# Patient Record
Sex: MALE | Race: OTHER | NOT HISPANIC OR LATINO | Employment: FULL TIME | URBAN - METROPOLITAN AREA
[De-identification: names, ages, dates, MRNs, and addresses within clinical notes are randomized per-mention and may not be internally consistent; named-entity substitution may affect disease eponyms.]

---

## 2023-01-04 ENCOUNTER — HOSPITAL ENCOUNTER (EMERGENCY)
Facility: HOSPITAL | Age: 43
Discharge: HOME | End: 2023-01-04
Attending: INTERNAL MEDICINE

## 2023-01-04 VITALS
SYSTOLIC BLOOD PRESSURE: 164 MMHG | HEIGHT: 69 IN | RESPIRATION RATE: 16 BRPM | WEIGHT: 235.44 LBS | BODY MASS INDEX: 34.87 KG/M2 | HEART RATE: 67 BPM | DIASTOLIC BLOOD PRESSURE: 77 MMHG | TEMPERATURE: 100 F | OXYGEN SATURATION: 97 %

## 2023-01-04 DIAGNOSIS — J10.1 INFLUENZA A: Primary | ICD-10-CM

## 2023-01-04 LAB
FLUAV AG UPPER RESP QL IA.RAPID: DETECTED
FLUBV AG UPPER RESP QL IA.RAPID: NOT DETECTED
SARS-COV-2 RNA RESP QL NAA+PROBE: NOT DETECTED
STREP A PCR (OHS): NOT DETECTED

## 2023-01-04 PROCEDURE — 25000003 PHARM REV CODE 250: Performed by: NURSE PRACTITIONER

## 2023-01-04 PROCEDURE — 87651 STREP A DNA AMP PROBE: CPT | Performed by: NURSE PRACTITIONER

## 2023-01-04 PROCEDURE — 0240U COVID/FLU A&B PCR: CPT | Performed by: NURSE PRACTITIONER

## 2023-01-04 PROCEDURE — 99284 EMERGENCY DEPT VISIT MOD MDM: CPT

## 2023-01-04 RX ORDER — ACETAMINOPHEN 500 MG
1000 TABLET ORAL
Status: COMPLETED | OUTPATIENT
Start: 2023-01-04 | End: 2023-01-04

## 2023-01-04 RX ORDER — OSELTAMIVIR PHOSPHATE 75 MG/1
75 CAPSULE ORAL 2 TIMES DAILY
Qty: 10 CAPSULE | Refills: 0 | Status: SHIPPED | OUTPATIENT
Start: 2023-01-04 | End: 2023-01-09

## 2023-01-04 RX ORDER — PROMETHAZINE HYDROCHLORIDE AND DEXTROMETHORPHAN HYDROBROMIDE 6.25; 15 MG/5ML; MG/5ML
5 SYRUP ORAL EVERY 4 HOURS PRN
Qty: 120 ML | Refills: 0 | Status: SHIPPED | OUTPATIENT
Start: 2023-01-04 | End: 2023-01-14

## 2023-01-04 RX ADMIN — ACETAMINOPHEN 1000 MG: 500 TABLET ORAL at 02:01

## 2023-01-04 NOTE — ED PROVIDER NOTES
Encounter Date: 1/4/2023       History     Chief Complaint   Patient presents with    Cough     Cough/congestion x 3 days. Febrile. No antipyretics taken at home.     The patient presents with occas nonprod cough, sore throat, nasal congestion, subjective fever, no cp or sob, no known covid-19 exposure.  The onset was 2  days ago.  The course/duration of symptoms is constant.  The degree at present is minimal.  The exacerbating factor is none.  The relieving factor is none.  Risk factors consist of none.  Prior episodes: occasional.  Associated symptoms: dry cough, nasal congestion, rhinorrhea, sore throat, denies chest pain and denies shortness of breath.  Additional history: none.       Review of patient's allergies indicates:  No Known Allergies  History reviewed. No pertinent past medical history.  History reviewed. No pertinent surgical history.  History reviewed. No pertinent family history.  Social History     Tobacco Use    Smoking status: Every Day     Packs/day: 0.25     Types: Cigarettes    Smokeless tobacco: Never     Review of Systems   Constitutional:  Positive for fever.   HENT:  Positive for congestion and sore throat.    Respiratory:  Positive for cough. Negative for shortness of breath.    Cardiovascular:  Negative for chest pain.   Gastrointestinal:  Negative for nausea.   Genitourinary:  Negative for dysuria.   Musculoskeletal:  Negative for back pain.   Skin:  Negative for rash.   Neurological:  Negative for weakness.   Hematological:  Does not bruise/bleed easily.   All other systems reviewed and are negative.    Physical Exam     Initial Vitals [01/04/23 1342]   BP Pulse Resp Temp SpO2   (!) 164/77 88 20 (!) 103 °F (39.4 °C) 97 %      MAP       --         Physical Exam    Nursing note and vitals reviewed.  Constitutional: He appears well-developed and well-nourished.   HENT:   Head: Normocephalic and atraumatic.   Right Ear: Tympanic membrane normal.   Left Ear: Tympanic membrane normal.    Nose: Nose normal.   Mouth/Throat: Uvula is midline, oropharynx is clear and moist and mucous membranes are normal.   Neck: Neck supple.   Normal range of motion.  Cardiovascular:  Normal rate, regular rhythm, normal heart sounds and intact distal pulses.           Pulmonary/Chest: Breath sounds normal.   Abdominal: Abdomen is soft. Bowel sounds are normal.   Musculoskeletal:         General: Normal range of motion.      Cervical back: Normal range of motion and neck supple.     Neurological: He is alert and oriented to person, place, and time. He has normal strength.   Skin: Skin is warm and dry.   Psychiatric: He has a normal mood and affect.       ED Course   Procedures  Labs Reviewed   COVID/FLU A&B PCR - Abnormal; Notable for the following components:       Result Value    Influenza A PCR Detected (*)     All other components within normal limits    Narrative:     The Xpert Xpress SARS-CoV-2/FLU/RSV plus is a rapid, multiplexed real-time PCR test intended for the simultaneous qualitative detection and differentiation of SARS-CoV-2, Influenza A, Influenza B, and respiratory syncytial virus (RSV) viral RNA in either nasopharyngeal swab or nasal swab specimens.         STREP GROUP A BY PCR - Normal    Narrative:     The Xpert Xpress Strep A test is a rapid, qualitative in vitro diagnostic test for the detection of Streptococcus pyogenes (Group A ß-hemolytic Streptococcus, Strep A) in throat swab specimens from patients with signs and symptoms of pharyngitis.            Imaging Results    None          Medications   acetaminophen tablet 1,000 mg (1,000 mg Oral Given 1/4/23 1418)     Medical Decision Making:   History:   Old Records Summarized: records from clinic visits and records from previous admission(s).  Clinical Tests:   Lab Tests: Ordered and Reviewed  4:15 PM DISPOSITION: The patient is resting comfortably in no acute distress.  He is hemodynamically stable and is without objective evidence for acute  process requiring urgent intervention or hospitalization. I provided counseling to patient with regard to condition, the treatment plan, specific conditions for return, and the importance of follow up. Detailed written and verbal instructions provided to patient and he expressed a verbal understanding of the discharge instructions and overall management plan. Reiterated the importance of medication administration and safety and advised patient to follow up with primary care provider in 3-5 days or sooner if needed.  Answered questions at this time. The patient is stable for discharge.                           Clinical Impression:   Final diagnoses:  [J10.1] Influenza A (Primary)        ED Disposition Condition    Discharge Stable          ED Prescriptions       Medication Sig Dispense Start Date End Date Auth. Provider    oseltamivir (TAMIFLU) 75 MG capsule Take 1 capsule (75 mg total) by mouth 2 (two) times daily. for 5 days 10 capsule 1/4/2023 1/9/2023 BRET Leon    promethazine-dextromethorphan (PROMETHAZINE-DM) 6.25-15 mg/5 mL Syrp Take 5 mLs by mouth every 4 (four) hours as needed (cough). 120 mL 1/4/2023 1/14/2023 BRET Leon          Follow-up Information       Follow up With Specialties Details Why Contact Info    follow up with your pcp in 3-5 days        [facility] Emergency Medicine  If symptoms worsen [address] [facility] [address] [phone]             BRET Leon  01/04/23 3795

## 2023-03-11 ENCOUNTER — HOSPITAL ENCOUNTER (EMERGENCY)
Facility: HOSPITAL | Age: 43
Discharge: HOME | End: 2023-03-11
Attending: STUDENT IN AN ORGANIZED HEALTH CARE EDUCATION/TRAINING PROGRAM

## 2023-03-11 VITALS
HEART RATE: 64 BPM | TEMPERATURE: 98 F | BODY MASS INDEX: 37.35 KG/M2 | HEIGHT: 66 IN | SYSTOLIC BLOOD PRESSURE: 121 MMHG | RESPIRATION RATE: 15 BRPM | OXYGEN SATURATION: 96 % | DIASTOLIC BLOOD PRESSURE: 68 MMHG | WEIGHT: 232.38 LBS

## 2023-03-11 DIAGNOSIS — R07.89 CHEST WALL PAIN: ICD-10-CM

## 2023-03-11 DIAGNOSIS — R07.9 CHEST PAIN: ICD-10-CM

## 2023-03-11 DIAGNOSIS — M25.512 LEFT SHOULDER PAIN: ICD-10-CM

## 2023-03-11 DIAGNOSIS — M62.838 MUSCLE SPASM OF LEFT SHOULDER: ICD-10-CM

## 2023-03-11 DIAGNOSIS — S29.011A STRAIN OF LEFT PECTORALIS MUSCLE, INITIAL ENCOUNTER: Primary | ICD-10-CM

## 2023-03-11 LAB
ALBUMIN SERPL-MCNC: 3.9 G/DL (ref 3.5–5)
ALBUMIN/GLOB SERPL: 0.8 RATIO (ref 1.1–2)
ALP SERPL-CCNC: 103 UNIT/L (ref 40–150)
ALT SERPL-CCNC: 80 UNIT/L (ref 0–55)
AST SERPL-CCNC: 71 UNIT/L (ref 5–34)
BASOPHILS # BLD AUTO: 0.03 X10(3)/MCL (ref 0–0.2)
BASOPHILS NFR BLD AUTO: 0.4 %
BILIRUBIN DIRECT+TOT PNL SERPL-MCNC: 0.4 MG/DL
BNP BLD-MCNC: 18.3 PG/ML
BUN SERPL-MCNC: 6.7 MG/DL (ref 8.9–20.6)
CALCIUM SERPL-MCNC: 8.9 MG/DL (ref 8.4–10.2)
CHLORIDE SERPL-SCNC: 105 MMOL/L (ref 98–107)
CK MB SERPL-MCNC: 6.5 NG/ML
CK SERPL-CCNC: 365 U/L (ref 30–200)
CO2 SERPL-SCNC: 18 MMOL/L (ref 22–29)
CREAT SERPL-MCNC: 0.81 MG/DL (ref 0.73–1.18)
EOSINOPHIL # BLD AUTO: 0.13 X10(3)/MCL (ref 0–0.9)
EOSINOPHIL NFR BLD AUTO: 1.8 %
ERYTHROCYTE [DISTWIDTH] IN BLOOD BY AUTOMATED COUNT: 12.5 % (ref 11.5–17)
GFR SERPLBLD CREATININE-BSD FMLA CKD-EPI: >60 MLS/MIN/1.73/M2
GLOBULIN SER-MCNC: 4.6 GM/DL (ref 2.4–3.5)
GLUCOSE SERPL-MCNC: 180 MG/DL (ref 74–100)
HCT VFR BLD AUTO: 52.9 % (ref 42–52)
HGB BLD-MCNC: 18.3 G/DL (ref 14–18)
IMM GRANULOCYTES # BLD AUTO: 0.02 X10(3)/MCL (ref 0–0.04)
IMM GRANULOCYTES NFR BLD AUTO: 0.3 %
LYMPHOCYTES # BLD AUTO: 2.66 X10(3)/MCL (ref 0.6–4.6)
LYMPHOCYTES NFR BLD AUTO: 36 %
MCH RBC QN AUTO: 30.2 PG
MCHC RBC AUTO-ENTMCNC: 34.6 G/DL (ref 33–36)
MCV RBC AUTO: 87.3 FL (ref 80–94)
MONOCYTES # BLD AUTO: 0.54 X10(3)/MCL (ref 0.1–1.3)
MONOCYTES NFR BLD AUTO: 7.3 %
NEUTROPHILS # BLD AUTO: 4 X10(3)/MCL (ref 2.1–9.2)
NEUTROPHILS NFR BLD AUTO: 54.2 %
NRBC BLD AUTO-RTO: 0 %
PLATELET # BLD AUTO: 155 X10(3)/MCL (ref 130–400)
PMV BLD AUTO: 11.4 FL (ref 7.4–10.4)
POTASSIUM SERPL-SCNC: 4 MMOL/L (ref 3.5–5.1)
PROT SERPL-MCNC: 8.5 GM/DL (ref 6.4–8.3)
RBC # BLD AUTO: 6.06 X10(6)/MCL (ref 4.7–6.1)
SODIUM SERPL-SCNC: 137 MMOL/L (ref 136–145)
TROPONIN I SERPL-MCNC: 0.01 NG/ML (ref 0–0.04)
WBC # SPEC AUTO: 7.4 X10(3)/MCL (ref 4.5–11.5)

## 2023-03-11 PROCEDURE — 82553 CREATINE MB FRACTION: CPT | Performed by: FAMILY MEDICINE

## 2023-03-11 PROCEDURE — 85025 COMPLETE CBC W/AUTO DIFF WBC: CPT | Performed by: FAMILY MEDICINE

## 2023-03-11 PROCEDURE — 80053 COMPREHEN METABOLIC PANEL: CPT | Performed by: FAMILY MEDICINE

## 2023-03-11 PROCEDURE — 63600175 PHARM REV CODE 636 W HCPCS: Performed by: STUDENT IN AN ORGANIZED HEALTH CARE EDUCATION/TRAINING PROGRAM

## 2023-03-11 PROCEDURE — 84484 ASSAY OF TROPONIN QUANT: CPT | Performed by: FAMILY MEDICINE

## 2023-03-11 PROCEDURE — 99285 EMERGENCY DEPT VISIT HI MDM: CPT | Mod: 25

## 2023-03-11 PROCEDURE — 83880 ASSAY OF NATRIURETIC PEPTIDE: CPT | Performed by: FAMILY MEDICINE

## 2023-03-11 PROCEDURE — 96372 THER/PROPH/DIAG INJ SC/IM: CPT | Performed by: STUDENT IN AN ORGANIZED HEALTH CARE EDUCATION/TRAINING PROGRAM

## 2023-03-11 PROCEDURE — 82550 ASSAY OF CK (CPK): CPT | Performed by: FAMILY MEDICINE

## 2023-03-11 PROCEDURE — 93005 ELECTROCARDIOGRAM TRACING: CPT

## 2023-03-11 RX ORDER — KETOROLAC TROMETHAMINE 10 MG/1
10 TABLET, FILM COATED ORAL EVERY 6 HOURS PRN
Qty: 20 TABLET | Refills: 0 | Status: SHIPPED | OUTPATIENT
Start: 2023-03-11 | End: 2023-03-16

## 2023-03-11 RX ORDER — KETOROLAC TROMETHAMINE 30 MG/ML
30 INJECTION, SOLUTION INTRAMUSCULAR; INTRAVENOUS
Status: COMPLETED | OUTPATIENT
Start: 2023-03-11 | End: 2023-03-11

## 2023-03-11 RX ORDER — NAPROXEN 500 MG/1
500 TABLET ORAL 2 TIMES DAILY PRN
Qty: 60 TABLET | Refills: 0 | Status: SHIPPED | OUTPATIENT
Start: 2023-03-11 | End: 2023-03-11

## 2023-03-11 RX ADMIN — KETOROLAC TROMETHAMINE 30 MG: 30 INJECTION, SOLUTION INTRAMUSCULAR at 08:03

## 2023-03-11 NOTE — Clinical Note
"Taiwo"Taiwo"Lauren Hyman was seen and treated in our emergency department on 3/11/2023.  He may return with limitations on 03/12/2023.  No heavy lifting for 4 days     Sincerely,      Pedro Kamara MD    "

## 2023-03-11 NOTE — ED PROVIDER NOTES
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 Also states swelling and tingling down left arm. Describes pain as "sharp" in nature and rates 10/10. Vss. 12 lead ekg obtained.     HPI    42-year-old  male who speaks fluent English presents emergency department for left-sided chest/arm pain that started 2 days ago.  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  Patient states that his arm and chest have been hurting ever since.  States it hurts when he moves his arm or presses on his chest wall.  Denies any shortness of breath.  States that he has been using Biofreeze with minimal improvement.  No ibuprofen or acetaminophen.  Denies a cough.  States that he can move his arm fully although it just hurts.    Review of patient's allergies indicates:  No Known Allergies  No past medical history on file.  No past surgical history on file.  No family history on file.  Social History     Tobacco Use    Smoking status: Every Day     Packs/day: 0.25     Types: Cigarettes    Smokeless tobacco: Never     Review of Systems   Constitutional:  Negative for fever.   Respiratory:  Negative for cough and shortness of breath.    Cardiovascular:  Positive for chest pain.   Gastrointestinal:  Negative for abdominal pain.   Musculoskeletal:         Left arm/shoulder pain   All other systems reviewed and are negative.    Physical Exam     Initial Vitals [03/11/23 0625]   BP Pulse Resp Temp SpO2   128/70 70 18 98.1 °F (36.7 °C) 95 %      MAP       --         Physical Exam    Nursing note and vitals reviewed.  Constitutional: He appears well-developed and well-nourished. No distress.   Cardiovascular:  Normal rate and regular rhythm.           Pulmonary/Chest: Breath sounds normal. No respiratory distress. He " exhibits tenderness (Generalized left-sided tenderness to the pictorial muscle and the left anterior shoulder girdle with palpation reproducing patient's complaint).   Abdominal: Abdomen is soft. Bowel sounds are normal. There is no abdominal tenderness.   Musculoskeletal:         General: Tenderness (generalized tenderness about the left shoulder with full range of motion) present. Normal range of motion.     Neurological: He is alert and oriented to person, place, and time. He has normal strength. GCS score is 15. GCS eye subscore is 4. GCS verbal subscore is 5. GCS motor subscore is 6.   Skin: Skin is warm. Capillary refill takes less than 2 seconds. No rash noted. No erythema.   Psychiatric: He has a normal mood and affect. Thought content normal.       ED Course   Procedures  Labs Reviewed   COMPREHENSIVE METABOLIC PANEL - Abnormal; Notable for the following components:       Result Value    Carbon Dioxide 18 (*)     Glucose Level 180 (*)     Blood Urea Nitrogen 6.7 (*)     Protein Total 8.5 (*)     Globulin 4.6 (*)     Albumin/Globulin Ratio 0.8 (*)     Alanine Aminotransferase 80 (*)     Aspartate Aminotransferase 71 (*)     All other components within normal limits   CK - Abnormal; Notable for the following components:    Creatine Kinase 365 (*)     All other components within normal limits   CBC WITH DIFFERENTIAL - Abnormal; Notable for the following components:    Hgb 18.3 (*)     Hct 52.9 (*)     MPV 11.4 (*)     All other components within normal limits   CK-MB - Normal   TROPONIN I - Normal   B-TYPE NATRIURETIC PEPTIDE - Normal   CBC W/ AUTO DIFFERENTIAL    Narrative:     The following orders were created for panel order CBC Auto Differential.  Procedure                               Abnormality         Status                     ---------                               -----------         ------                     CBC with Differential[[id number]]        Abnormal            Final result                  Please view results for these tests on the individual orders.   EXTRA TUBES    Narrative:     The following orders were created for panel order EXTRA TUBES.  Procedure                               Abnormality         Status                     ---------                               -----------         ------                     Light Blue Top Hold[[id number]]                              In process                 Light Green Top Hold[[id number]]                             In process                 Gold Top Hold[[id number]]                                    In process                   Please view results for these tests on the individual orders.   LIGHT BLUE TOP HOLD   LIGHT GREEN TOP HOLD   GOLD TOP HOLD     EKG Readings: (Independently Interpreted)   Initial Reading: No STEMI. Rhythm: Normal Sinus Rhythm. Heart Rate: 71. Ectopy: No Ectopy. Conduction: Normal. ST Segments: Normal ST Segments. T Waves: Normal. T Waves Flipped: III and AVF. Axis: Right Axis Deviation. Clinical Impression: Normal Sinus Rhythm     Imaging Results              X-Ray Shoulder Trauma Left (Final result)  Result time 03/11/23 08:07:02      Final result by Drake Gallardo MD (03/11/23 08:07:02)                   Impression:      Degenerative changes of the acromioclavicular joint with no acute fracture identified.      Electronically signed by: Drake Gallardo  Date:    03/11/2023  Time:    08:07               Narrative:    EXAMINATION:  XR SHOULDER TRAUMA 3 VIEW LEFT    CLINICAL HISTORY:  Pain in left shoulder    TECHNIQUE:  Three views of the left shoulder were performed.    COMPARISON  None    FINDINGS:  No acute fracture.  The visualized lungs are unremarkable.  Osteophytes of the acromioclavicular joint are noted.                        Wet Read by Pedro Kamara MD (03/11/23 07:37:39, [facility], Emergency Medicine)    Arthritic changes.  No fractures or dislocations                                      X-Ray Chest 1 View (Final result)  Result time 03/11/23 07:53:40      Final result by Drake Gallardo MD (03/11/23 07:53:40)                   Impression:      No acute cardiopulmonary process.      Electronically signed by: Drake Gallardo  Date:    03/11/2023  Time:    07:53               Narrative:    EXAMINATION:  XR CHEST 1 VIEW    CLINICAL HISTORY:  Chest pain, unspecified    TECHNIQUE:  Single view of the chest    COMPARISON:  No prior imaging available for comparison.    FINDINGS:  No focal opacification, pleural effusion, or pneumothorax.    The cardiomediastinal silhouette is within normal limits.    No acute osseous abnormality.                        Wet Read by Pedro Kamara MD (03/11/23 07:37:17, [facility], Emergency Medicine)    Lungs clear with no consolidations                                     Medications   ketorolac injection 30 mg (30 mg Intramuscular Given 3/11/23 0800)     Medical Decision Making:   Differential Diagnosis:   Musculoskeletal pain, muscle spasm, muscle strain, chest pain, pneumothorax, ACS   Medical Decision Making  Problems Addressed:  Left shoulder pain: acute illness or injury  Muscle spasm of left shoulder: acute illness or injury  Strain of left pectoralis muscle, initial encounter: acute illness or injury    Amount and/or Complexity of Data Reviewed  Labs: ordered. Decision-making details documented in ED Course.  Radiology: ordered and independent interpretation performed. Decision-making details documented in ED Course.  ECG/medicine tests: ordered and independent interpretation performed. Decision-making details documented in ED Course.    Risk  OTC drugs.  Prescription drug management.              ED Course as of 03/11/23 0823   Sat Mar 11, 2023   0708 WBC: 7.4 [BS]   0708 Hemoglobin(!): 18.3 [BS]   0708 Hematocrit(!): 52.9 [BS]   0708 Platelets: 155 [BS]   0746 Sodium: 137 [BS]   0746 Potassium: 4.0 [BS]   0746 Chloride: 105 [BS]    0746 CO2(!): 18 [BS]   0746 Glucose(!): 180 [BS]   0746 BUN(!): 6.7 [BS]   0746 Creatinine: 0.81 [BS]   0746 CPK MB: 6.5 [BS]   0746 CPK(!): 365 [BS]   0822 Patient pain improved.  Will discharge.  ER precautions given.  Limitations to work.  Stretching information given. [BS]      ED Course User Index  [BS] Pedro Kamara MD                 Clinical Impression:   Final diagnoses:  [R07.9] Chest pain  [M25.512] Left shoulder pain  [R07.89] Chest wall pain  [M62.838] Muscle spasm of left shoulder  [S29.011A] Strain of left pectoralis muscle, initial encounter (Primary)        ED Disposition Condition    Discharge Stable          ED Prescriptions       Medication Sig Dispense Start Date End Date Auth. Provider    naproxen (NAPROSYN) 500 MG tablet Take 1 tablet (500 mg total) by mouth 2 (two) times daily as needed (pain). 60 tablet 3/11/2023 -- Pedro Kamara MD          Follow-up Information       Follow up With Specialties Details Why Contact Info    [facility] Emergency Medicine Go to  If symptoms worsen [address] [facility] [address] [phone]             Pedro Kamara MD  03/11/23 9756

## 2024-11-21 ENCOUNTER — HOSPITAL ENCOUNTER (EMERGENCY)
Facility: HOSPITAL | Age: 44
Discharge: HOME | End: 2024-11-22
Attending: EMERGENCY MEDICINE

## 2024-11-21 DIAGNOSIS — Z72.0 TOBACCO ABUSE: ICD-10-CM

## 2024-11-21 DIAGNOSIS — R07.89 CHEST WALL PAIN: Primary | ICD-10-CM

## 2024-11-21 DIAGNOSIS — R07.9 CHEST PAIN: ICD-10-CM

## 2024-11-21 PROCEDURE — 99285 EMERGENCY DEPT VISIT HI MDM: CPT | Mod: 25

## 2024-11-21 PROCEDURE — 93005 ELECTROCARDIOGRAM TRACING: CPT

## 2024-11-21 RX ORDER — KETOROLAC TROMETHAMINE 30 MG/ML
30 INJECTION, SOLUTION INTRAMUSCULAR; INTRAVENOUS
Status: COMPLETED | OUTPATIENT
Start: 2024-11-22 | End: 2024-11-21

## 2024-11-21 RX ORDER — KETOROLAC TROMETHAMINE 30 MG/ML
15 INJECTION, SOLUTION INTRAMUSCULAR; INTRAVENOUS
Status: DISCONTINUED | OUTPATIENT
Start: 2024-11-22 | End: 2024-11-21

## 2024-11-21 RX ADMIN — KETOROLAC TROMETHAMINE 30 MG: 30 INJECTION, SOLUTION INTRAMUSCULAR at 11:11

## 2024-11-21 NOTE — Clinical Note
"Taiwo Lr" Lauren Hyman was seen and treated in our emergency department on 11/21/2024.  He may return to work on 11/23/2024.       If you have any questions or concerns, please don't hesitate to call.       RN    "

## 2024-11-22 VITALS
DIASTOLIC BLOOD PRESSURE: 90 MMHG | TEMPERATURE: 97 F | WEIGHT: 236.31 LBS | OXYGEN SATURATION: 93 % | HEART RATE: 65 BPM | SYSTOLIC BLOOD PRESSURE: 148 MMHG | BODY MASS INDEX: 35.81 KG/M2 | HEIGHT: 68 IN | RESPIRATION RATE: 17 BRPM

## 2024-11-22 LAB
ALBUMIN SERPL-MCNC: 3.9 G/DL (ref 3.5–5)
ALBUMIN/GLOB SERPL: 1 RATIO (ref 1.1–2)
ALP SERPL-CCNC: 110 UNIT/L (ref 40–150)
ALT SERPL-CCNC: 80 UNIT/L (ref 0–55)
ANION GAP SERPL CALC-SCNC: 8 MEQ/L
AST SERPL-CCNC: 67 UNIT/L (ref 5–34)
BASOPHILS # BLD AUTO: 0.03 X10(3)/MCL
BASOPHILS NFR BLD AUTO: 0.3 %
BILIRUB SERPL-MCNC: 0.6 MG/DL
BUN SERPL-MCNC: 6 MG/DL (ref 8.9–20.6)
CALCIUM SERPL-MCNC: 9 MG/DL (ref 8.4–10.2)
CHLORIDE SERPL-SCNC: 108 MMOL/L (ref 98–107)
CO2 SERPL-SCNC: 25 MMOL/L (ref 22–29)
CREAT SERPL-MCNC: 0.78 MG/DL (ref 0.72–1.25)
CREAT/UREA NIT SERPL: 8
EOSINOPHIL # BLD AUTO: 0.16 X10(3)/MCL (ref 0–0.9)
EOSINOPHIL NFR BLD AUTO: 1.7 %
ERYTHROCYTE [DISTWIDTH] IN BLOOD BY AUTOMATED COUNT: 12.3 % (ref 11.5–17)
GFR SERPLBLD CREATININE-BSD FMLA CKD-EPI: >60 ML/MIN/1.73/M2
GLOBULIN SER-MCNC: 3.9 GM/DL (ref 2.4–3.5)
GLUCOSE SERPL-MCNC: 110 MG/DL (ref 74–100)
HCT VFR BLD AUTO: 52.6 % (ref 42–52)
HGB BLD-MCNC: 18.8 G/DL (ref 14–18)
IMM GRANULOCYTES # BLD AUTO: 0.03 X10(3)/MCL (ref 0–0.04)
IMM GRANULOCYTES NFR BLD AUTO: 0.3 %
LYMPHOCYTES # BLD AUTO: 3.65 X10(3)/MCL (ref 0.6–4.6)
LYMPHOCYTES NFR BLD AUTO: 38.1 %
MCH RBC QN AUTO: 31.1 PG (ref 27–31)
MCHC RBC AUTO-ENTMCNC: 35.7 G/DL (ref 33–36)
MCV RBC AUTO: 86.9 FL (ref 80–94)
MONOCYTES # BLD AUTO: 0.84 X10(3)/MCL (ref 0.1–1.3)
MONOCYTES NFR BLD AUTO: 8.8 %
NEUTROPHILS # BLD AUTO: 4.88 X10(3)/MCL (ref 2.1–9.2)
NEUTROPHILS NFR BLD AUTO: 50.8 %
NRBC BLD AUTO-RTO: 0 %
OHS QRS DURATION: 108 MS
OHS QTC CALCULATION: 442 MS
PLATELET # BLD AUTO: 178 X10(3)/MCL (ref 130–400)
PMV BLD AUTO: 10.1 FL (ref 7.4–10.4)
POTASSIUM SERPL-SCNC: 3.4 MMOL/L (ref 3.5–5.1)
PROT SERPL-MCNC: 7.8 GM/DL (ref 6.4–8.3)
RBC # BLD AUTO: 6.05 X10(6)/MCL (ref 4.7–6.1)
SODIUM SERPL-SCNC: 141 MMOL/L (ref 136–145)
TROPONIN I SERPL-MCNC: <0.01 NG/ML (ref 0–0.04)
WBC # BLD AUTO: 9.59 X10(3)/MCL (ref 4.5–11.5)

## 2024-11-22 PROCEDURE — 84484 ASSAY OF TROPONIN QUANT: CPT | Performed by: EMERGENCY MEDICINE

## 2024-11-22 PROCEDURE — 63600175 PHARM REV CODE 636 W HCPCS: Performed by: EMERGENCY MEDICINE

## 2024-11-22 PROCEDURE — 80053 COMPREHEN METABOLIC PANEL: CPT | Performed by: EMERGENCY MEDICINE

## 2024-11-22 PROCEDURE — 85025 COMPLETE CBC W/AUTO DIFF WBC: CPT | Performed by: EMERGENCY MEDICINE

## 2024-11-22 PROCEDURE — 96372 THER/PROPH/DIAG INJ SC/IM: CPT | Performed by: EMERGENCY MEDICINE

## 2024-11-22 RX ORDER — NAPROXEN 500 MG/1
500 TABLET ORAL 2 TIMES DAILY PRN
Qty: 14 TABLET | Refills: 1 | Status: SHIPPED | OUTPATIENT
Start: 2024-11-22

## 2024-11-22 NOTE — DISCHARGE INSTRUCTIONS
Tests are all fine and are consistent with musculoskeletal/chest wall pain.      Medication as prescribed should help, follow-up with your doctor or return to the ER as needed.      You do need to stop smoking.

## 2024-11-22 NOTE — ED PROVIDER NOTES
Encounter Date: 11/21/2024       History     Chief Complaint   Patient presents with    Chest Pain     Chest pain and cough x2 days     Left-sided chest, chest wall, shoulder girdle, and back pain for about 2 days, worse with certain movements, probably associated with repetitive heavy pulling and lifting that he does at work, similar episodes in the past not requiring treatment or evaluation.  Mild intermittent cough without sputum production.  No fever, chills, sweats, dyspnea, or other complaints.  He does smoke but has no known history of hypertension, heart disease or other significant past medical history.  Family history negative for significant cart heart disease.  Here with his girlfriend, no other complaints.    The history is provided by the patient and a significant other. No  was used.     Review of patient's allergies indicates:  No Known Allergies  History reviewed. No pertinent past medical history.  History reviewed. No pertinent surgical history.  No family history on file.  Social History     Tobacco Use    Smoking status: Every Day     Current packs/day: 0.25     Types: Cigarettes    Smokeless tobacco: Never     Review of Systems   Respiratory:  Positive for cough.    Cardiovascular:  Positive for chest pain.       Physical Exam     Initial Vitals [11/21/24 2314]   BP Pulse Resp Temp SpO2   (!) 145/82 68 20 96.8 °F (36 °C) 98 %      MAP       --         Physical Exam    Nursing note and vitals reviewed.  Constitutional: He appears well-developed and well-nourished. He is not diaphoretic. No distress.   Stocky, muscular, mildly obese, no distress   HENT:   Head: Normocephalic and atraumatic. Mouth/Throat: Oropharynx is clear and moist. No oropharyngeal exudate.   Eyes: Conjunctivae and EOM are normal. Pupils are equal, round, and reactive to light. Right eye exhibits no discharge. Left eye exhibits no discharge. No scleral icterus.   Neck: Neck supple. No thyromegaly present.  No tracheal deviation present. No JVD present.   Normal range of motion.  Cardiovascular:  Normal rate, regular rhythm and normal heart sounds.     Exam reveals no gallop and no friction rub.       No murmur heard.  Pulmonary/Chest: Breath sounds normal. No respiratory distress. He has no wheezes. He has no rhonchi. He has no rales. He exhibits tenderness.   Left chest, shoulder girdle, and back muscular tenderness with palpation and range of motion testing reproduces presenting complaints.   Abdominal: Abdomen is soft. Bowel sounds are normal. He exhibits no distension and no mass. There is no abdominal tenderness. There is no rebound and no guarding.   Musculoskeletal:         General: No tenderness or edema. Normal range of motion.      Cervical back: Normal range of motion and neck supple.     Lymphadenopathy:     He has no cervical adenopathy.   Neurological: He is alert and oriented to person, place, and time. He has normal strength. No cranial nerve deficit.   Skin: Skin is warm and dry. No rash noted. No erythema.   Psychiatric: He has a normal mood and affect. His behavior is normal. Judgment and thought content normal.         ED Course   Procedures  Labs Reviewed   COMPREHENSIVE METABOLIC PANEL - Abnormal       Result Value    Sodium 141      Potassium 3.4 (*)     Chloride 108 (*)     CO2 25      Glucose 110 (*)     Blood Urea Nitrogen 6.0 (*)     Creatinine 0.78      Calcium 9.0      Protein Total 7.8      Albumin 3.9      Globulin 3.9 (*)     Albumin/Globulin Ratio 1.0 (*)     Bilirubin Total 0.6            ALT 80 (*)     AST 67 (*)     eGFR >60      Anion Gap 8.0      BUN/Creatinine Ratio 8     CBC WITH DIFFERENTIAL - Abnormal    WBC 9.59      RBC 6.05      Hgb 18.8 (*)     Hct 52.6 (*)     MCV 86.9      MCH 31.1 (*)     MCHC 35.7      RDW 12.3      Platelet 178      MPV 10.1      Neut % 50.8      Lymph % 38.1      Mono % 8.8      Eos % 1.7      Basophil % 0.3      Lymph # 3.65      Neut # 4.88       Mono # 0.84      Eos # 0.16      Baso # 0.03      IG# 0.03      IG% 0.3      NRBC% 0.0     TROPONIN I - Normal    Troponin-I <0.010     CBC W/ AUTO DIFFERENTIAL    Narrative:     The following orders were created for panel order CBC auto differential.  Procedure                               Abnormality         Status                     ---------                               -----------         ------                     CBC with Differential[[id number]]       Abnormal            Final result                 Please view results for these tests on the individual orders.   EXTRA TUBES    Narrative:     The following orders were created for panel order EXTRA TUBES.  Procedure                               Abnormality         Status                     ---------                               -----------         ------                     Light Blue Top Hold[[id number]]                                                        Gold Top Hold[[id number]]                                                                Please view results for these tests on the individual orders.   LIGHT BLUE TOP HOLD   GOLD TOP HOLD     EKG Readings: (Independently Interpreted)   Initial Reading: No STEMI. Rhythm: Normal Sinus Rhythm. Heart Rate: 70. Ectopy: No Ectopy.   Rightward axis/ possible old IMI; no change from previous       Imaging Results              X-Ray Chest PA And Lateral (Preliminary result)  Result time 11/22/24 00:43:49      Wet Read by Glenn Worrell MD (11/22/24 00:43:49, [facility], Emergency Medicine)    NAF                                     Medications   ketorolac injection 30 mg (30 mg Intramuscular Given 11/21/24 8156)         1:13 AM Stable/ counseled/ reassured/ d/c with the following instructions:        Tests are all fine and are consistent with musculoskeletal/chest wall pain.      Medication as prescribed should help, follow-up with your doctor or return to the ER as needed.       You do need to stop smoking.        Medical Decision Making  Left chest wall, chest, shoulder girdle and back pain requiring routine investigation with relatively low cardiac risk profile.    Problems Addressed:  Chest pain: acute illness or injury    Amount and/or Complexity of Data Reviewed  Labs: ordered. Decision-making details documented in ED Course.  Radiology: ordered and independent interpretation performed. Decision-making details documented in ED Course.  ECG/medicine tests: ordered and independent interpretation performed. Decision-making details documented in ED Course.    Risk  Prescription drug management.  Decision regarding hospitalization.      Additional MDM:   Differential Diagnosis:   Musculoskeletal chest wall pain, angina, pleurisy among others     PERC Rule:   Age is greater than or equal to 50 = 0.0  Heart Rate is greater than or equal to 100 = 0.0  SaO2 on room air < 95% = 0.0  Unilateral leg swelling = 0.0  Hemoptysis = 0.0  Recent surgery or trauma = 0.0  Prior PE or DVT =  0.0  Hormone use = 0.00  PERC Score = 0        Well's Criteria Score:  -Clinical symptoms of DVT (leg swelling, pain with palpation) = 0.0  -PE is #1 diagnosis OR equally likely =            0.0  -Heart Rate >100 =   0.0  -Immobilization (= or > than 3 days) or surgery in the previous 4 weeks = 0.0  -Previous DVT/PE = 0.0  -Hemoptysis =          0.0  -Malignancy =           0.0  Well's Probability Score =    0                                     Clinical Impression:  Final diagnoses:  [R07.9] Chest pain  [R07.89] Chest wall pain (Primary)  [Z72.0] Tobacco abuse          ED Disposition Condition    Discharge Stable          ED Prescriptions       Medication Sig Dispense Start Date End Date Auth. Provider    naproxen (NAPROSYN) 500 MG tablet Take 1 tablet (500 mg total) by mouth 2 (two) times daily as needed. 14 tablet 11/22/2024 -- Glenn Worrell MD          Follow-up Information       Follow up With Specialties  Details Why Contact Info    [facility] Emergency Medicine  As needed [address] [facility] [address] [phone]             Glenn Worrell MD  11/22/24 0114

## 2025-07-19 ENCOUNTER — HOSPITAL ENCOUNTER (EMERGENCY)
Facility: HOSPITAL | Age: 45
Discharge: HOME | End: 2025-07-19
Attending: INTERNAL MEDICINE

## 2025-07-19 VITALS
BODY MASS INDEX: 37.83 KG/M2 | TEMPERATURE: 99 F | HEIGHT: 67 IN | RESPIRATION RATE: 18 BRPM | HEART RATE: 68 BPM | OXYGEN SATURATION: 98 % | SYSTOLIC BLOOD PRESSURE: 155 MMHG | WEIGHT: 241 LBS | DIASTOLIC BLOOD PRESSURE: 74 MMHG

## 2025-07-19 DIAGNOSIS — S52.615A CLOSED NONDISPLACED FRACTURE OF STYLOID PROCESS OF LEFT ULNA, INITIAL ENCOUNTER: ICD-10-CM

## 2025-07-19 DIAGNOSIS — S52.572A OTHER CLOSED INTRA-ARTICULAR FRACTURE OF DISTAL END OF LEFT RADIUS, INITIAL ENCOUNTER: Primary | ICD-10-CM

## 2025-07-19 PROCEDURE — 29105 APPLICATION LONG ARM SPLINT: CPT | Mod: LT

## 2025-07-19 PROCEDURE — 96372 THER/PROPH/DIAG INJ SC/IM: CPT

## 2025-07-19 PROCEDURE — 99284 EMERGENCY DEPT VISIT MOD MDM: CPT | Mod: 25

## 2025-07-19 PROCEDURE — 63600175 PHARM REV CODE 636 W HCPCS

## 2025-07-19 PROCEDURE — 25000003 PHARM REV CODE 250

## 2025-07-19 RX ORDER — HYDROCODONE BITARTRATE AND ACETAMINOPHEN 10; 325 MG/1; MG/1
1 TABLET ORAL
Refills: 0 | Status: COMPLETED | OUTPATIENT
Start: 2025-07-19 | End: 2025-07-19

## 2025-07-19 RX ORDER — ONDANSETRON 4 MG/1
4 TABLET, ORALLY DISINTEGRATING ORAL
Status: COMPLETED | OUTPATIENT
Start: 2025-07-19 | End: 2025-07-19

## 2025-07-19 RX ORDER — IBUPROFEN 600 MG/1
600 TABLET, FILM COATED ORAL EVERY 6 HOURS PRN
Qty: 20 TABLET | Refills: 0 | Status: ON HOLD | OUTPATIENT
Start: 2025-07-19 | End: 2025-07-29 | Stop reason: HOSPADM

## 2025-07-19 RX ORDER — MORPHINE SULFATE 2 MG/ML
4 INJECTION, SOLUTION INTRAMUSCULAR; INTRAVENOUS
Status: COMPLETED | OUTPATIENT
Start: 2025-07-19 | End: 2025-07-19

## 2025-07-19 RX ORDER — HYDROCODONE BITARTRATE AND ACETAMINOPHEN 10; 325 MG/1; MG/1
1 TABLET ORAL EVERY 6 HOURS PRN
Qty: 25 TABLET | Refills: 0 | Status: ON HOLD | OUTPATIENT
Start: 2025-07-19 | End: 2025-07-29 | Stop reason: HOSPADM

## 2025-07-19 RX ADMIN — ONDANSETRON 4 MG: 4 TABLET, ORALLY DISINTEGRATING ORAL at 08:07

## 2025-07-19 RX ADMIN — MORPHINE SULFATE 4 MG: 2 INJECTION, SOLUTION INTRAMUSCULAR; INTRAVENOUS at 08:07

## 2025-07-19 RX ADMIN — HYDROCODONE BITARTRATE AND ACETAMINOPHEN 1 TABLET: 10; 325 TABLET ORAL at 05:07

## 2025-07-19 NOTE — Clinical Note
"Taiwo Lr" Lauren Hyman was seen and treated in our emergency department on 7/19/2025.  He may return to work on 07/24/2025.       If you have any questions or concerns, please don't hesitate to call.       RN    "

## 2025-07-19 NOTE — ED PROVIDER NOTES
Encounter Date: 7/19/2025       History     Chief Complaint   Patient presents with    Arm Pain     Pt fell onto left arm with wrist pain and swelling just prior to arrival. Did not hit head or LOC.      A 44 y.o. male patient with a history of no known medical problems presents to the ED with left wrist and hand pain. The onset is PTA after falling and attempting to catch himself with that hand. Denies any headache, head trauma, or LOC. Admits swelling, and notable deformity. Admits pain with wrist ROM. Finger ROM intact. Radial pulse 2+. Neurovascularly intact.       The history is provided by the patient. The history is limited by a language barrier. A  was used (accompanying adult translated).     Review of patient's allergies indicates:  No Known Allergies  No past medical history on file.  No past surgical history on file.  No family history on file.  Social History[1]  Review of Systems   Constitutional:  Negative for chills and fever.   Eyes:  Negative for visual disturbance.   Respiratory:  Negative for shortness of breath.    Cardiovascular:  Negative for chest pain.   Gastrointestinal:  Negative for nausea and vomiting.   Genitourinary:  Negative for difficulty urinating and dysuria.   Musculoskeletal:  Positive for arthralgias, joint swelling and myalgias.   Skin:  Negative for color change and rash.   Neurological:  Negative for weakness and headaches.   Hematological:  Does not bruise/bleed easily.   Psychiatric/Behavioral:  Negative for confusion.    All other systems reviewed and are negative.      Physical Exam     Initial Vitals [07/19/25 1727]   BP Pulse Resp Temp SpO2   (!) 161/84 72 20 98.5 °F (36.9 °C) 96 %      MAP       --         Physical Exam    Nursing note and vitals reviewed.  Constitutional: He appears well-developed and well-nourished.   HENT:   Head: Normocephalic and atraumatic.   Right Ear: External ear normal.   Left Ear: External ear normal.   Nose: Nose  normal.   Eyes: Conjunctivae and EOM are normal.   Neck: Neck supple.   Cardiovascular:  Normal rate, regular rhythm and normal heart sounds.     Exam reveals no gallop and no friction rub.       No murmur heard.  Pulmonary/Chest: Breath sounds normal. No respiratory distress. He has no wheezes. He has no rhonchi. He has no rales.   Abdominal: He exhibits no distension.   Musculoskeletal:      Left wrist: Swelling, deformity (appears to have dorsal angulation or contusion of wrist), tenderness and bony tenderness present. No lacerations, snuff box tenderness or crepitus. Decreased range of motion. Normal pulse.      Left hand: Tenderness and bony tenderness present. No swelling, deformity or lacerations. Normal range of motion. Normal strength. Normal sensation. There is no disruption of two-point discrimination. Normal capillary refill. Normal pulse.        Arms:       Cervical back: Neck supple.     Neurological: He is alert and oriented to person, place, and time. GCS eye subscore is 4. GCS verbal subscore is 5. GCS motor subscore is 6.   Skin: Skin is warm and dry. Capillary refill takes less than 2 seconds.         ED Course   Splint Application    Date/Time: 7/19/2025 9:23 PM    Performed by: Nahed Shine PA  Authorized by: Donte Smith MD  Consent Done: Yes  Consent: Verbal consent obtained  Risks and benefits: risks, benefits and alternatives were discussed  Consent given by: patient  Patient understanding: patient states understanding of the procedure being performed  Patient identity confirmed: verbally with patient  Location details: left wrist  Splint type: sugar tong  Post-procedure: The splinted body part was neurovascularly unchanged following the procedure.  Patient tolerance: Patient tolerated the procedure well with no immediate complications        Labs Reviewed - No data to display       Imaging Results              X-Ray Wrist 2 View Left (Preliminary result)  Result time  07/19/25 20:50:20   Procedure changed from X-Ray Wrist Complete Left     Wet Read by Naehd Shine PA (07/19/25 20:50:20, [facility], Emergency Medicine)    Displaced distal radius fracture. Ulnar styloid fracture.                                      X-Ray Hand 3 view Left (Preliminary result)  Result time 07/19/25 20:49:22      Wet Read by Nahed Shine PA (07/19/25 20:49:22, [facility], Emergency Medicine)    Fractures of distal ulna and radius as described in wrist x-ray. No other acute fractures of hand.                                      X-Ray Wrist Complete Left (Final result)  Result time 07/19/25 18:18:51      Final result by Rakesh Bro MD (07/19/25 18:18:51)                   Impression:      Fractures.      Electronically signed by: Rakesh Bro  Date:    07/19/2025  Time:    18:18               Narrative:    EXAMINATION:  XR WRIST COMPLETE 3 VIEWS LEFT    CLINICAL HISTORY:  Fall.    TECHNIQUE:  Three views.    COMPARISON:  None available.    FINDINGS:  There is impacted fracture distal radius with some displacement.  There appear focal radial articular surface involvement on the ulnar aspect.  There is also minimal fracture line across the ulnar styloid process without displacement.                                       Medications   HYDROcodone-acetaminophen  mg per tablet 1 tablet (1 tablet Oral Given 7/19/25 1751)   morphine injection 4 mg (4 mg Intramuscular Given 7/19/25 2017)   ondansetron disintegrating tablet 4 mg (4 mg Oral Given 7/19/25 2017)     Medical Decision Making  A 44 y.o. male patient with a history of no known medical problems presents to the ED with left wrist and hand pain. The onset is PTA after falling and attempting to catch himself with that hand. Denies any headache, head trauma, or LOC. Admits swelling, and notable deformity. Admits pain with wrist ROM. Finger ROM intact. Radial pulse 2+. Neurovascularly  intact.     X-ray shows impacted, displaced distal radius fracture and nondisplaced ulnar styloid fracture.  Patient placed in finger traps for 1 hour with 10 lb weight.   Repeat x-ray shows improved impaction, still displaced.  Sugar tong splint placed while in traction. Patient to follow-up with orthopedics.     Patient's condition improved with the following Medications  HYDROcodone-acetaminophen  mg per tablet 1 tablet (1 tablet Oral Given 7/19/25 1751)  morphine injection 4 mg (4 mg Intramuscular Given 7/19/25 2017)  ondansetron disintegrating tablet 4 mg (4 mg Oral Given 7/19/25 2017)    Clinical impression:  Other closed intra-articular fracture of distal end of left radius, initial encounter (Primary)  Closed nondisplaced fracture of styloid process of left ulna, initial encounter    Patient is non-toxic appearing and tolerating nutritional intake. Patient's vital signs and clinical condition are stable for DC with ED Prescriptions     Medication Sig Dispense Start Date End Date Auth. Provider    ibuprofen (ADVIL,MOTRIN) 600 MG tablet Take 1 tablet (600 mg total) by   mouth every 6 (six) hours as needed for Pain. 20 tablet 7/19/2025 --   Nahed Shine PA    HYDROcodone-acetaminophen (NORCO)  mg per tablet Take 1 tablet by   mouth every 6 (six) hours as needed for Pain. 25 tablet 7/19/2025 --   Nahed Shine PA         Follow-up: PCP or Internal medicine clinic within 3 days  Referrals made: orthopedics    Strict follow-up precautions given. Patient verbalizes understanding of treatment plan and ED return precautions.       Amount and/or Complexity of Data Reviewed  Radiology: ordered and independent interpretation performed. Decision-making details documented in ED Course.    Risk  Prescription drug management.  Risk Details: Given strict ED return precautions. I have spoken with the patient and/or caregivers. I have explained the patient's condition, diagnoses and treatment plan based  on the information available to me at this time. I have answered the patient's and/or caregiver's questions and addressed any concerns. The patient and/or caregivers have as good an understanding of the patient's diagnosis, condition and treatment plan as can be expected at this point. The patient's condition is stable and appropriate for discharge from the emergency department.      The patient will pursue further outpatient evaluation with the primary care physician or other designated or consulting physician as outlined in the discharge instructions. The patient and/or caregivers are agreeable to this plan of care and follow-up instructions have been explained in detail. The patient and/or caregivers have received these instructions in written format and have expressed an understanding of the discharge instructions. The patient and/or caregivers are aware that any significant change in condition or worsening of symptoms should prompt an immediate return to this or the closest emergency department or a call to 911.               Additional MDM:   Differential Diagnosis:   Other: The following diagnoses were also considered and will be evaluated: Contusion, Strain and Sprain.            ED Course as of 07/19/25 2123   Sat Jul 19, 2025 1821 X-Ray Wrist Complete Left  There is impacted fracture distal radius with some displacement.  There appear focal radial articular surface involvement on the ulnar aspect.  There is also minimal fracture line across the ulnar styloid process without displacement.   [AG]   1847 Patient placed in finger traps with 10 lb weight to arm.  [AG]   2050 X-Ray Wrist 2 View Left  Displaced distal radius fracture. Ulnar styloid fracture.  [AG]      ED Course User Index  [AG] Nahed Shine, PA                               Clinical Impression:  Final diagnoses:  [S52.867Y] Other closed intra-articular fracture of distal end of left radius, initial encounter (Primary)  [S52.924U] Closed  nondisplaced fracture of styloid process of left ulna, initial encounter          ED Disposition Condition    Discharge Stable          ED Prescriptions       Medication Sig Dispense Start Date End Date Auth. Provider    ibuprofen (ADVIL,MOTRIN) 600 MG tablet Take 1 tablet (600 mg total) by mouth every 6 (six) hours as needed for Pain. 20 tablet 7/19/2025 -- Nahed Shine PA    HYDROcodone-acetaminophen (NORCO)  mg per tablet Take 1 tablet by mouth every 6 (six) hours as needed for Pain. 25 tablet 7/19/2025 -- Nahed Shine PA          Follow-up Information       Follow up With Specialties Details Why Contact Info    [facility] Emergency Medicine Go to  If symptoms worsen, As needed [facility] [facility] [address] [phone]    Your primary care provider  Go in 3 days      [facility] Orthopedics Call in 3 days to schedule an appointment. [address] [facility] [address] [phone]                   [1]   Social History  Tobacco Use    Smoking status: Every Day     Current packs/day: 0.25     Types: Cigarettes    Smokeless tobacco: Never        Nahed Shine PA  07/19/25 8472

## 2025-07-28 ENCOUNTER — HOSPITAL ENCOUNTER (OUTPATIENT)
Dept: RADIOLOGY | Facility: HOSPITAL | Age: 45
Discharge: HOME | End: 2025-07-28
Attending: SPECIALIST

## 2025-07-28 ENCOUNTER — OFFICE VISIT (OUTPATIENT)
Dept: ORTHOPEDICS | Facility: CLINIC | Age: 45
End: 2025-07-28

## 2025-07-28 ENCOUNTER — ANESTHESIA EVENT (OUTPATIENT)
Dept: SURGERY | Facility: HOSPITAL | Age: 45
End: 2025-07-28

## 2025-07-28 VITALS
OXYGEN SATURATION: 97 % | WEIGHT: 233.69 LBS | TEMPERATURE: 98 F | HEIGHT: 67 IN | SYSTOLIC BLOOD PRESSURE: 144 MMHG | HEART RATE: 57 BPM | BODY MASS INDEX: 36.68 KG/M2 | DIASTOLIC BLOOD PRESSURE: 81 MMHG

## 2025-07-28 DIAGNOSIS — S52.572A OTHER CLOSED INTRA-ARTICULAR FRACTURE OF DISTAL END OF LEFT RADIUS, INITIAL ENCOUNTER: ICD-10-CM

## 2025-07-28 DIAGNOSIS — S52.615A CLOSED NONDISPLACED FRACTURE OF STYLOID PROCESS OF LEFT ULNA, INITIAL ENCOUNTER: ICD-10-CM

## 2025-07-28 PROCEDURE — 99215 OFFICE O/P EST HI 40 MIN: CPT | Mod: PBBFAC,25

## 2025-07-28 PROCEDURE — 73110 X-RAY EXAM OF WRIST: CPT | Mod: TC,LT

## 2025-07-28 PROCEDURE — 99204 OFFICE O/P NEW MOD 45 MIN: CPT | Mod: S$PBB,,, | Performed by: SPECIALIST

## 2025-07-28 RX ORDER — ONDANSETRON 4 MG/1
4 TABLET, FILM COATED ORAL EVERY 8 HOURS PRN
Qty: 15 TABLET | Refills: 0 | Status: SHIPPED | OUTPATIENT
Start: 2025-07-28 | End: 2025-07-28

## 2025-07-28 RX ORDER — CEFAZOLIN SODIUM 2 G/50ML
2 SOLUTION INTRAVENOUS
Status: CANCELLED | OUTPATIENT
Start: 2025-07-28

## 2025-07-28 RX ORDER — OXYCODONE HYDROCHLORIDE 5 MG/1
5 TABLET ORAL EVERY 6 HOURS PRN
Qty: 25 TABLET | Refills: 0 | Status: SHIPPED | OUTPATIENT
Start: 2025-07-28 | End: 2025-07-28

## 2025-07-28 RX ORDER — ACETAMINOPHEN 500 MG
1000 TABLET ORAL EVERY 8 HOURS
Qty: 90 TABLET | Refills: 0 | Status: SHIPPED | OUTPATIENT
Start: 2025-07-28

## 2025-07-28 RX ORDER — ACETAMINOPHEN 500 MG
1000 TABLET ORAL EVERY 8 HOURS
Qty: 90 TABLET | Refills: 0 | Status: SHIPPED | OUTPATIENT
Start: 2025-07-28 | End: 2025-07-28

## 2025-07-28 RX ORDER — OXYCODONE HYDROCHLORIDE 5 MG/1
5 TABLET ORAL EVERY 6 HOURS PRN
Qty: 25 TABLET | Refills: 0 | Status: SHIPPED | OUTPATIENT
Start: 2025-07-28

## 2025-07-28 RX ORDER — GABAPENTIN 300 MG/1
300 CAPSULE ORAL 3 TIMES DAILY
Qty: 90 CAPSULE | Refills: 0 | Status: SHIPPED | OUTPATIENT
Start: 2025-07-28 | End: 2025-07-28

## 2025-07-28 RX ORDER — MELOXICAM 15 MG/1
15 TABLET ORAL DAILY
Qty: 14 TABLET | Refills: 0 | Status: SHIPPED | OUTPATIENT
Start: 2025-07-28 | End: 2025-07-28

## 2025-07-28 RX ORDER — GABAPENTIN 300 MG/1
300 CAPSULE ORAL 3 TIMES DAILY
Qty: 90 CAPSULE | Refills: 0 | Status: SHIPPED | OUTPATIENT
Start: 2025-07-28 | End: 2026-07-28

## 2025-07-28 RX ORDER — MUPIROCIN 20 MG/G
OINTMENT TOPICAL
Status: CANCELLED | OUTPATIENT
Start: 2025-07-28

## 2025-07-28 RX ORDER — MELOXICAM 15 MG/1
15 TABLET ORAL DAILY
Qty: 14 TABLET | Refills: 0 | Status: SHIPPED | OUTPATIENT
Start: 2025-07-28

## 2025-07-28 RX ORDER — SODIUM CHLORIDE 9 MG/ML
INJECTION, SOLUTION INTRAVENOUS CONTINUOUS
Status: CANCELLED | OUTPATIENT
Start: 2025-07-28

## 2025-07-28 RX ORDER — ONDANSETRON 4 MG/1
4 TABLET, FILM COATED ORAL EVERY 8 HOURS PRN
Qty: 15 TABLET | Refills: 0 | Status: SHIPPED | OUTPATIENT
Start: 2025-07-28 | End: 2025-08-04

## 2025-07-28 NOTE — PROGRESS NOTES
[facility] Progress Note     In brief, 44 y.o. right-hand dominant male  who fell off his truck tailgate on 07/19/2025 sustaining a left distal radius fracture    HPI:   44 y.o. right-hand dominant male  who fell off his truck tailgate on 07/19/2025 sustaining a left distal radius fracture.  There was no blood or open wounds at the time of injury.  He has no numbness in his hand.  He was seen in the emergency room and placed in a splint.  He is here today for initial evaluation.    Patient has no medical problems.  He smokes 3 cigarettes per day but has not smoked in the last 10 days.  He does not drink alcohol every day.  He has never had surgery before.  He takes no medications    PMH: History reviewed. No pertinent past medical history.    PSH: History reviewed. No pertinent surgical history.    SH: Social History[1]    FH:   Family History   Family history unknown: Yes       Allergies: Review of patient's allergies indicates:  No Known Allergies     Physical Exam:    Vitals:    07/28/25 1401   BP: (!) 144/81   Pulse: (!) 57   Temp: 98.2 °F (36.8 °C)       General: NAD  Cardio: Regular rate by peripheral pulse   Pulm: Normal WOB on room air, symmetric chest expansion  Abd: Soft, NT/ND  Psych: normal affect/mood  Neuro: A&O    Left wrist:   No open wounds abrasions.  No significant ecchymosis.  Moderate swelling  Tenderness to palpation of the distal radius   Motor intact and/PIN systemic issues regarding   [place]:  M/U/R   2+ radial pulse    Imaging:  XR left wrist:  Intra-articular distal radius fracture with significant shortening, 10° inclination, and neutral tilt     Assessment/Plan:  44 y.o. right-hand dominant male  who fell off his truck tailgate on 07/19/2025 sustaining a left distal radius fracture.  Plan for ORIF left distal radius tomorrow with Dr. Mera  Pain meds provided today, he will not get painless  tomorrow  Nonweightbearing left upper extremity, splint were placed  Follow up for surgery tomorrow    Immanuel Theodore MD  [employer] [facility]opa[facility] Surgery, PGY-5  7/28/2025 3:52 PM           [1]   Social History  Socioeconomic History    Marital status: Single   Tobacco Use    Smoking status: Every Day     Current packs/day: 0.25     Types: Cigarettes    Smokeless tobacco: Never   Vaping Use    Vaping status: Never Used   Substance and Sexual Activity    Alcohol use: Not Currently    Drug use: Not Currently    Sexual activity: Not Currently

## 2025-07-28 NOTE — ANESTHESIA PREPROCEDURE EVALUATION
Taiwo Hyman is a 44 y.o. male presenting for ORIF, FRACTURE, RADIUS, DISTAL (Left) with a history of   -Other closed intra-articular fracture of distal end of left radius   -mildly elevated LFTs 11/2024 during ER visit  -Obesity, BMI 37  -mild HTN, no treatment  -smoker    BETA-BLOCKER: none    New Orders for Anesthesia: none  Active Ambulatory Problems     Diagnosis Date Noted    No Active Ambulatory Problems     Resolved Ambulatory Problems     Diagnosis Date Noted    No Resolved Ambulatory Problems     No Additional Past Medical History       Pre-op Assessment    I have reviewed the NPO Status.      Review of Systems  Anesthesia Hx:  No problems with previous Anesthesia                Social:  Smoker       Cardiovascular:     Hypertension, poorly controlled                                          Pulmonary:  Pulmonary Normal                       Renal/:  Renal/ Normal                 Hepatic/GI:  Hepatic/GI Normal                    Neurological:  Neurology Normal                                      Endocrine:        Obesity / BMI > 30    Vitals:    07/29/25 0652 07/29/25 0656 07/29/25 0739   BP:  (!) 152/89    Pulse:  63    Resp:   16   Temp:  36.6 °C (97.8 °F)    TempSrc:  Oral    SpO2:  (!) 93%    Weight: 105.6 kg (232 lb 12.8 oz)           Physical Exam  General: Alert, Cooperative and Well nourished    Airway:  Mallampati: II   Mouth Opening: Normal  TM Distance: Normal  Tongue: Normal  Neck ROM: Normal ROM    Dental:  Intact    Chest/Lungs:  Clear to auscultation, Normal Respiratory Rate    Heart:  Rate: Normal  Rhythm: Regular Rhythm  Sounds: Normal      Lab Results   Component Value Date    WBC 9.59 11/22/2024    HGB 18.8 (H) 11/22/2024    HCT 52.6 (H) 11/22/2024    MCV 86.9 11/22/2024     11/22/2024       CMP  Sodium   Date Value Ref Range Status   11/22/2024 141 136 - 145 mmol/L Final     Potassium   Date Value Ref Range Status   11/22/2024 3.4 (L) 3.5 - 5.1 mmol/L Final      Chloride   Date Value Ref Range Status   11/22/2024 108 (H) 98 - 107 mmol/L Final     CO2   Date Value Ref Range Status   11/22/2024 25 22 - 29 mmol/L Final     Glucose   Date Value Ref Range Status   11/22/2024 110 (H) 74 - 100 mg/dL Final     Blood Urea Nitrogen   Date Value Ref Range Status   11/22/2024 6.0 (L) 8.9 - 20.6 mg/dL Final     Creatinine   Date Value Ref Range Status   11/22/2024 0.78 0.72 - 1.25 mg/dL Final     Calcium   Date Value Ref Range Status   11/22/2024 9.0 8.4 - 10.2 mg/dL Final     Protein Total   Date Value Ref Range Status   11/22/2024 7.8 6.4 - 8.3 gm/dL Final     Albumin   Date Value Ref Range Status   11/22/2024 3.9 3.5 - 5.0 g/dL Final     Bilirubin Total   Date Value Ref Range Status   11/22/2024 0.6 <=1.5 mg/dL Final     ALP   Date Value Ref Range Status   11/22/2024 110 40 - 150 unit/L Final     AST   Date Value Ref Range Status   11/22/2024 67 (H) 5 - 34 unit/L Final     ALT   Date Value Ref Range Status   11/22/2024 80 (H) 0 - 55 unit/L Final     eGFR   Date Value Ref Range Status   11/22/2024 >60 mL/min/1.73/m2 Final           Anesthesia Plan  Type of Anesthesia, risks & benefits discussed:    Anesthesia Type: MAC, Regional  Intra-op Monitoring Plan: Standard ASA Monitors  Post Op Pain Control Plan: IV/PO Opioids PRN  Induction:  IV  Informed Consent: Informed consent signed with the Patient and all parties understand the risks and agree with anesthesia plan.  All questions answered.   ASA Score: 2  Day of Surgery Review of History & Physical: H&P Update referred to the surgeon/provider.    Ready For Surgery From Anesthesia Perspective.     .

## 2025-07-28 NOTE — LETTER
[facility]  [address] [facility] [address]  Phone: [phone]     Taiwo Hyman   1980              Surgery Date 07/29/2025 07/28/2025     How to Prepare for Surgery (Orthopedic)  About this topic   There are some things that are common for most kinds of surgery. These help to keep you safe. Learn what you can do to get ready for your surgery. This will help you and the team caring for you during your surgery. Also, learn about the things the doctor and nurses do to keep you safe during surgery.  You may have outpatient surgery where you come in the day of your surgery and then go home hours after your surgery.  In other cases, you may be admitted to the hospital the day before your surgery, or you may have to stay in the hospital after your surgery.  Who will contact you before your surgery   Anesthesiology may call you to speak with you about the surgery and to see if you ever had any issues with anesthesiology before.   Preop nurse will call to go over your medical history with you 5 - 7 days before your surgery.  Their number is [phone].   Daytime surgery will call the day before between 3 pm and 6 pm to give you the time to arrive for your surgery. Their number is [phone].   General   Weeks before your surgery:  These are things you can do to lower your chances of having problems after your surgery:  Stop smoking if you are a smoker. Avoid being around others who smoke for several weeks before and after your surgery. Smoking limits how much oxygen gets to your body for healing.  If you have diabetes, keep your blood sugars as near normal as you can. This helps lower your chance of infection or other problems after your surgery.  Talk to your doctor about all the drugs you take. This includes over-the-counter drugs, natural products, and vitamins.  There may be some you can take the day of your surgery. If you have diabetes, talk to your  doctor about your drugs, especially if you are on insulin. Your doctor may tell you to take less than usual on the day of your surgery.  If you take insulin or any diabetic medication do not take the morning of surgery. __________________________________________________________________________________  Clearance that is needed _________________________________________________________  Your doctor may want you to stop taking some of your drugs before surgery. Some drugs and natural products make it harder for your blood to clot. Then, you may have more bleeding during or after surgery. Be sure to tell your doctor if you are taking any drugs that may cause bleeding.   Common Medication Perioperative Consideration Stop prior to surgery   Aspirin Risk for Bleeding 7 days   NSAID's (antiinflammatories) Examples: Mobic/Meloxicam  Advil/Motrin/Ibuprofen/Aleve  Naproixen/Naprosyn  Voltaren/Diclofenac/Arthotec  /Celebrex/Celecoxib  /Toradol/Ketorolac  Ralafen/Nabumetone/Arthotec Risk for Bleeding 7 days   Phentermine/Diet Pills  Anesthesia, BP, Cardiac Side Effects 7 days   GIP/Glp-1 Agonist  Examples: Trulicity (Dulaglutide) Ozempic/Wegovy (Semaglutide) Victoza/Saxenda (liraglutide) Mounjaro (Trizepatide) Delayed Gastric Emptying causing Increase risk of aspiration in surgery 14 days   Omega-3/Fish oil Increase risk for bleeding 7 days    Medication that  need to be stopped ____________________________________________________________________________________________________________________________________________________________________________________________  Herbal Supplements you should stop     Herbal Supplement Operative Concerns Stop before Surgery   Echinacea Can be associated with allergic reaction 7 days   Ephedra (Mahung) Increase blood pressure and heart rate with anesthesia 7 days   All Vitamins, CBD supplement, Garlic, Tash, Tumeric,Umary Increase risk of bleeding 7 days   Gingko Biloba Increase risk of  bleeding 7 days   Ginseng Low Glucose, decrease effects of coumadin 7 days   Kava Increased sedation with anesthesia  7 days   Julito Wort Multiple drug interaction with coumadin, steroids, and some heart medication 7 days   Valerian Increase sedation Taper dose  14 days before surgery   Phentermine Cardiac side effects 7 days   Fenfluramine (Phen Phen) Cardiac side effects 7 days      Have the tests done that your doctor orders before your surgery. Your doctor may want you to have lab tests, an x-ray, or EKG to see how healthy you are. Having these tests helps the doctor plan for your care during surgery.  Mild exercise like walking, riding a bike, or swimming may be good for you. It may help you breathe more easily before and after surgery. Ask your doctor if it is OK for you to exercise before surgery. Also, practice taking deep breaths. Often after surgery, you will be asked to take deep breaths and cough. This may help prevent lung infections.  Follow a healthy diet and eat nutritious, well-balanced meals. Eat a healthy meal for supper the day before your surgery. Avoid beer, wine, and mixed drinks (alcohol).  You will not be allowed to drive right away after surgery. Ask a family member or a friend to drive you home.  Most often your doctor will want you to have an adult stay with you for at least 24 hours. Arrange with family or friends to stay with you on the first day after your surgery or when you go home.  If you get sick with a cold, infection, or other illness in the 2 weeks before your surgery, call your doctor's office. You may need to change when you have surgery because you may be more at risk for infection or other problems.  Check your skin for rashes, cuts, insect bites, or any skin infections and report these to your doctor before surgery. Pay attention to the areas that you cannot see easily, such as the bottoms of your feet and back. Check in skin folds for any bumps or skin rashes.  Shaving  should be stopped at least 2 days before surgery on all areas of the body including the face, legs, underarms, etc.   Day before and morning of your surgery:  Daytime surgery will call the day before between 3 pm and 6 pm to give you the time to arrive for your surgery. Their number is [phone].    Wash your hair and take a bath or shower before your surgery (around 7 pm). The doctor may give you special soap or wipes to wash with before the surgery to lessen the germs on your skin. Follow the directions how to use this special soap or wipes provided by your doctor. Your skin should be completely dry and cool. When applied to sensitive skin, the cloths may irritate the skin such as temporary itching sensation and /or redness.  If itching or redness persists, rinse affected area and discontinue use.  Clean skin in the following order using one cloth for each step.   AVOID CONTACT WITH EYES, EARS, MOUTH,AND MUCOUS MEMBRANES (VAGINAL OR RECTAL).                     YOU MUST USE ALL OF THE CLOTHS  Cloth #1  Wipe YOUR Neck and chest.  Cloth #2  Wipe both arms to fingertips and both armpits.  Cloth #3  Wipe both hips followed by groin area. Be sure to wipe the folds of your stomach and groin.  DO NOT use on vaginal and rectal areas.  Cloth #4  Wipe both legs starting at the thigh and ending at the toes.  Cloth #5  Wipe your back.  Cloth #6  Wipe your buttocks.  Do not use body lotions, perfumes, powders, or creams on your skin, especially near the surgical site. Do not wear makeup, especially eye makeup.  You will also need to take off nail polish and jewelry. Remove any jewelry from body piercings.  Stop eating and drinking at the time you are told to. This helps to make sure that your stomach is empty while you are under anesthesia.  Brush your teeth before your surgery. Take extra care not to drink any water while you brush. If your child is having surgery, watch that your child does not drink any water while they  brush.  Leave all valuables and jewelry at home.  What to expect when you arrive for surgery:  At the hospital or surgery center, the staff will work to get you ready for your surgery. Here are some of the things that will happen before you get to the operating room:  You will have a bracelet that has your name, birth date, and other information on it. Staff may check this bracelet often or ask you your name and birthdate. This is a safety check to make sure they have the right patient.  If you have allergies, you may have to wear a bracelet with them listed on it. You may also have a bracelet to tell staff that you are at a higher risk of falling.  If you have a history of sleep apnea and use a CPAP machine for sleep, please bring the CPAP with you if you are spending the night after your surgery.  You will change out of your clothing and wear a hospital gown. You will need to take off your glasses and remove contact lenses, hearing aids, and dentures before your surgery.  The staff will:  Give you warm blankets or use a warming blanket so you are not cold.  Take your temperature and blood pressure. They may also ask about or check your height and weight.  Ask questions about your health history and allergies. You may have to tell this information to others as well. This is another safety check.  Put an IV in your hand or arm to give you fluids and drugs. You may be given a drug to make you sleepy.  The staff may:  Give you a special mouthwash to use. This helps lower the number of germs in your mouth.  Put special stockings or boots on your legs and feet to help with blood flow.  Use clippers to remove hair around the area of your surgical cut, depending on the site of your surgery.  The anesthesia team will:  Ask about your history and allergies.  Ask you to sign a consent after they speak with you about their anesthesia plan for you.  Want to know if you have any loose teeth before your surgery.  Talk with you  about your surgery and what they will do to keep you comfortable during surgery.  Have you meet people who will be in the operating room with you.  Your doctor will:  Talk with you about your surgery and the risks and benefits. Be sure to ask any questions you may have. You may need to sign a consent form if you have not already done so.  Talk with you about the possible need for blood products. You may be asked to sign a consent for blood products as well.  Sign or geovani the part of your body where you will be having surgery. This is a safety check.    What will the results be?   You will be ready for your surgery.  What drugs may be needed?   The doctor may order drugs before your surgery to:  Help with fear or worry and help you to relax  Prevent infection  Prevent blood clots  The doctor may order drugs after surgery to:  Help lessen pain  Help prevent or lessen upset stomach  Prevent infection  When do I need to call the doctor?   Signs of infection. These include a fever of 100.4°F (38°C) or higher, chills.  If you have a cough, cold, fever, or become ill a few days before you are scheduled to have surgery. Your doctor may want to reschedule it.  If you do not have a ride to and from your surgery  If you have any skin rashes, cuts, boils, or infections on your skin. Your doctor may want to reschedule the surgery since this can put you at risk for wound infection after your surgery.  Helpful tips   Wear loose clothing and comfortable shoes that are easy to put on and take off.  Remove all body piercings before you come for your surgery.  Bring these things with you to the hospital:  Your insurance card and photo ID  A copy of your advance directive if you have one  A list of all drugs that you take and the amounts that you take  A list of all your allergies and the kind of reaction they cause you  Make sure you will be able to move about your home easily after your surgery. You may need extra pillows or a  recliner to rest in.  Have foods in your home that will be easy on your belly after surgery. You may want things like juices, crackers, soups, and Jello.

## 2025-07-29 ENCOUNTER — ANESTHESIA (OUTPATIENT)
Dept: SURGERY | Facility: HOSPITAL | Age: 45
End: 2025-07-29

## 2025-07-29 ENCOUNTER — HOSPITAL ENCOUNTER (OUTPATIENT)
Facility: HOSPITAL | Age: 45
Discharge: HOME | End: 2025-07-29
Attending: SPECIALIST | Admitting: SPECIALIST

## 2025-07-29 DIAGNOSIS — S52.572A OTHER CLOSED INTRA-ARTICULAR FRACTURE OF DISTAL END OF LEFT RADIUS, INITIAL ENCOUNTER: ICD-10-CM

## 2025-07-29 PROCEDURE — 76942 ECHO GUIDE FOR BIOPSY: CPT | Performed by: ANESTHESIOLOGY

## 2025-07-29 PROCEDURE — 36000711: Performed by: SPECIALIST

## 2025-07-29 PROCEDURE — 25000003 PHARM REV CODE 250

## 2025-07-29 PROCEDURE — 63600175 PHARM REV CODE 636 W HCPCS: Performed by: ANESTHESIOLOGY

## 2025-07-29 PROCEDURE — 71000016 HC POSTOP RECOV ADDL HR: Performed by: SPECIALIST

## 2025-07-29 PROCEDURE — 37000008 HC ANESTHESIA 1ST 15 MINUTES: Performed by: SPECIALIST

## 2025-07-29 PROCEDURE — 37000009 HC ANESTHESIA EA ADD 15 MINS: Performed by: SPECIALIST

## 2025-07-29 PROCEDURE — 71000015 HC POSTOP RECOV 1ST HR: Performed by: SPECIALIST

## 2025-07-29 PROCEDURE — 27201423 OPTIME MED/SURG SUP & DEVICES STERILE SUPPLY: Performed by: SPECIALIST

## 2025-07-29 PROCEDURE — 63600175 PHARM REV CODE 636 W HCPCS

## 2025-07-29 PROCEDURE — 25609 OPTX DST RD XART FX/EP SEP3+: CPT | Mod: LT,,, | Performed by: SPECIALIST

## 2025-07-29 PROCEDURE — C1713 ANCHOR/SCREW BN/BN,TIS/BN: HCPCS | Performed by: SPECIALIST

## 2025-07-29 PROCEDURE — 36000710: Performed by: SPECIALIST

## 2025-07-29 PROCEDURE — 63600175 PHARM REV CODE 636 W HCPCS: Mod: TB

## 2025-07-29 PROCEDURE — 63600175 PHARM REV CODE 636 W HCPCS: Performed by: STUDENT IN AN ORGANIZED HEALTH CARE EDUCATION/TRAINING PROGRAM

## 2025-07-29 DEVICE — IMPLANTABLE DEVICE: Type: IMPLANTABLE DEVICE | Site: ARM | Status: FUNCTIONAL

## 2025-07-29 DEVICE — SCREW CORTEX 2.7 X 16MM: Type: IMPLANTABLE DEVICE | Site: ARM | Status: FUNCTIONAL

## 2025-07-29 RX ORDER — SODIUM CHLORIDE, SODIUM LACTATE, POTASSIUM CHLORIDE, CALCIUM CHLORIDE 600; 310; 30; 20 MG/100ML; MG/100ML; MG/100ML; MG/100ML
INJECTION, SOLUTION INTRAVENOUS CONTINUOUS PRN
Status: DISCONTINUED | OUTPATIENT
Start: 2025-07-29 | End: 2025-07-29

## 2025-07-29 RX ORDER — MORPHINE SULFATE 2 MG/ML
2 INJECTION, SOLUTION INTRAMUSCULAR; INTRAVENOUS EVERY 5 MIN PRN
Status: DISCONTINUED | OUTPATIENT
Start: 2025-07-29 | End: 2025-07-29 | Stop reason: HOSPADM

## 2025-07-29 RX ORDER — MIDAZOLAM HYDROCHLORIDE 2 MG/2ML
5 INJECTION, SOLUTION INTRAMUSCULAR; INTRAVENOUS ONCE AS NEEDED
Status: COMPLETED | OUTPATIENT
Start: 2025-07-29 | End: 2025-07-29

## 2025-07-29 RX ORDER — SODIUM CHLORIDE 9 MG/ML
INJECTION, SOLUTION INTRAVENOUS CONTINUOUS
Status: DISCONTINUED | OUTPATIENT
Start: 2025-07-29 | End: 2025-07-29 | Stop reason: HOSPADM

## 2025-07-29 RX ORDER — CEFAZOLIN SODIUM 1 G/3ML
2 INJECTION, POWDER, FOR SOLUTION INTRAMUSCULAR; INTRAVENOUS
Status: COMPLETED | OUTPATIENT
Start: 2025-07-29 | End: 2025-07-29

## 2025-07-29 RX ORDER — ROPIVACAINE HYDROCHLORIDE 5 MG/ML
INJECTION, SOLUTION EPIDURAL; INFILTRATION; PERINEURAL
Status: COMPLETED | OUTPATIENT
Start: 2025-07-29 | End: 2025-07-29

## 2025-07-29 RX ORDER — FENTANYL CITRATE 50 UG/ML
INJECTION, SOLUTION INTRAMUSCULAR; INTRAVENOUS
Status: DISCONTINUED | OUTPATIENT
Start: 2025-07-29 | End: 2025-07-29

## 2025-07-29 RX ORDER — MUPIROCIN 20 MG/G
OINTMENT TOPICAL
Status: DISCONTINUED | OUTPATIENT
Start: 2025-07-29 | End: 2025-07-29 | Stop reason: HOSPADM

## 2025-07-29 RX ORDER — GLUCAGON 1 MG
1 KIT INJECTION
Status: DISCONTINUED | OUTPATIENT
Start: 2025-07-29 | End: 2025-07-29 | Stop reason: HOSPADM

## 2025-07-29 RX ORDER — PROPOFOL 10 MG/ML
INJECTION, EMULSION INTRAVENOUS
Status: DISCONTINUED | OUTPATIENT
Start: 2025-07-29 | End: 2025-07-29

## 2025-07-29 RX ORDER — SODIUM CHLORIDE, SODIUM LACTATE, POTASSIUM CHLORIDE, CALCIUM CHLORIDE 600; 310; 30; 20 MG/100ML; MG/100ML; MG/100ML; MG/100ML
INJECTION, SOLUTION INTRAVENOUS CONTINUOUS
Status: DISCONTINUED | OUTPATIENT
Start: 2025-07-29 | End: 2025-07-29 | Stop reason: HOSPADM

## 2025-07-29 RX ORDER — SODIUM CHLORIDE 0.9 % (FLUSH) 0.9 %
10 SYRINGE (ML) INJECTION
Status: DISCONTINUED | OUTPATIENT
Start: 2025-07-29 | End: 2025-07-29 | Stop reason: HOSPADM

## 2025-07-29 RX ORDER — PROPOFOL 10 MG/ML
VIAL (ML) INTRAVENOUS CONTINUOUS PRN
Status: DISCONTINUED | OUTPATIENT
Start: 2025-07-29 | End: 2025-07-29

## 2025-07-29 RX ORDER — LIDOCAINE HYDROCHLORIDE 20 MG/ML
INJECTION INTRAVENOUS
Status: DISCONTINUED | OUTPATIENT
Start: 2025-07-29 | End: 2025-07-29

## 2025-07-29 RX ORDER — GLYCOPYRROLATE 0.2 MG/ML
INJECTION INTRAMUSCULAR; INTRAVENOUS
Status: DISCONTINUED | OUTPATIENT
Start: 2025-07-29 | End: 2025-07-29

## 2025-07-29 RX ORDER — ONDANSETRON HYDROCHLORIDE 2 MG/ML
4 INJECTION, SOLUTION INTRAVENOUS DAILY PRN
Status: DISCONTINUED | OUTPATIENT
Start: 2025-07-29 | End: 2025-07-29 | Stop reason: HOSPADM

## 2025-07-29 RX ADMIN — PROPOFOL 30 MG: 10 INJECTION, EMULSION INTRAVENOUS at 09:07

## 2025-07-29 RX ADMIN — FENTANYL CITRATE 25 MCG: 50 INJECTION INTRAMUSCULAR; INTRAVENOUS at 09:07

## 2025-07-29 RX ADMIN — ROPIVACAINE HYDROCHLORIDE 30 ML: 5 INJECTION, SOLUTION EPIDURAL; INFILTRATION; PERINEURAL at 08:07

## 2025-07-29 RX ADMIN — FENTANYL CITRATE 25 MCG: 50 INJECTION INTRAMUSCULAR; INTRAVENOUS at 10:07

## 2025-07-29 RX ADMIN — KETAMINE HYDROCHLORIDE 20 MG: 50 INJECTION INTRAMUSCULAR; INTRAVENOUS at 09:07

## 2025-07-29 RX ADMIN — SODIUM CHLORIDE, POTASSIUM CHLORIDE, SODIUM LACTATE AND CALCIUM CHLORIDE: 600; 310; 30; 20 INJECTION, SOLUTION INTRAVENOUS at 09:07

## 2025-07-29 RX ADMIN — KETAMINE HYDROCHLORIDE 10 MG: 50 INJECTION INTRAMUSCULAR; INTRAVENOUS at 09:07

## 2025-07-29 RX ADMIN — SODIUM CHLORIDE, POTASSIUM CHLORIDE, SODIUM LACTATE AND CALCIUM CHLORIDE: 600; 310; 30; 20 INJECTION, SOLUTION INTRAVENOUS at 10:07

## 2025-07-29 RX ADMIN — CEFAZOLIN 2 G: 330 INJECTION, POWDER, FOR SOLUTION INTRAMUSCULAR; INTRAVENOUS at 09:07

## 2025-07-29 RX ADMIN — MIDAZOLAM HYDROCHLORIDE 5 MG: 1 INJECTION, SOLUTION INTRAMUSCULAR; INTRAVENOUS at 08:07

## 2025-07-29 RX ADMIN — PROPOFOL 20 MG: 10 INJECTION, EMULSION INTRAVENOUS at 09:07

## 2025-07-29 RX ADMIN — GLYCOPYRROLATE 0.1 MG: 0.2 INJECTION INTRAMUSCULAR; INTRAVENOUS at 10:07

## 2025-07-29 RX ADMIN — LIDOCAINE HYDROCHLORIDE 60 MG: 20 INJECTION INTRAVENOUS at 09:07

## 2025-07-29 RX ADMIN — PROPOFOL 75 MCG/KG/MIN: 10 INJECTION, EMULSION INTRAVENOUS at 09:07

## 2025-07-29 NOTE — ANESTHESIA POSTPROCEDURE EVALUATION
Anesthesia Post Evaluation    Patient: [facility] Beacheulalio Hyman    Procedure(s) Performed: Procedure(s) (LRB):  ORIF, FRACTURE, RADIUS, DISTAL (Left)    Final Anesthesia Type: general      Patient location during evaluation: [facility]  Post-procedure vital signs: reviewed and stable  Airway patency: patent      Anesthetic complications: no      Cardiovascular status: hemodynamically stable  Respiratory status: spontaneous ventilation  Follow-up not needed.              Vitals Value Taken Time   /96 07/29/25 11:46   Temp 36.3 °C (97.4 °F) 07/29/25 10:45   Pulse 57 07/29/25 11:49   Resp 20 07/29/25 11:45   SpO2 96 % 07/29/25 11:49   Vitals shown include unfiled device data.      No case tracking events are documented in the log.      Pain/Xena Score: Xena Score: 10 (7/29/2025 10:45 AM)  Modified Xena Score: 20 (7/29/2025 11:45 AM)

## 2025-07-29 NOTE — PROGRESS NOTES
Faculty Attestation: I was present and scrubbed throughout the key elements of the procedure.  I agree with the resident's findings and description.    Aramis Mera  Orthopaedic Surgery

## 2025-07-29 NOTE — H&P
[facility] H&P  The patient has been personally evaluated by me. They verbally confirmed they will undergo ORIf left distal radius with Dr. Mera. There have been no changes in patient's health since the last time they were seen.    - consent signed  - patient marked  - NPO since midnight  - all patient questions answered    Immanuel Theodore MD, PGY-5  [facility] Orthopaedic Surgery         [facility]opa[facility] Progress Note     In brief, 44 y.o. right-hand dominant male  who fell off his truck tailgate on 07/19/2025 sustaining a left distal radius fracture     HPI:   44 y.o. right-hand dominant male  who fell off his truck tailgate on 07/19/2025 sustaining a left distal radius fracture.  There was no blood or open wounds at the time of injury.  He has no numbness in his hand.  He was seen in the emergency room and placed in a splint.  He is here today for initial evaluation.     Patient has no medical problems.  He smokes 3 cigarettes per day but has not smoked in the last 10 days.  He does not drink alcohol every day.  He has never had surgery before.  He takes no medications     PMH: History reviewed. No pertinent past medical history.     PSH: History reviewed. No pertinent surgical history.     SH: [Social History]    [Social History]        Socioeconomic History    Marital status: Single   Tobacco Use    Smoking status: Every Day       Current packs/day: 0.25       Types: Cigarettes    Smokeless tobacco: Never   Vaping Use    Vaping status: Never Used   Substance and Sexual Activity    Alcohol use: Not Currently    Drug use: Not Currently    Sexual activity: Not Currently        FH:   Family History   Family history unknown: Yes         Allergies: Review of patient's allergies indicates:  No Known Allergies     Physical Exam:         Vitals:     07/28/25 1401   BP: (!) 144/81   Pulse: (!) 57   Temp: 98.2 °F (36.8 °C)         General: NAD  Cardio: Regular rate by  peripheral pulse   Pulm: Normal WOB on room air, symmetric chest expansion  Abd: Soft, NT/ND  Psych: normal affect/mood  Neuro: A&O     Left wrist:   No open wounds abrasions.  No significant ecchymosis.  Moderate swelling  Tenderness to palpation of the distal radius   Motor intact and/PIN systemic issues regarding   Gonzales:  M/U/R   2+ radial pulse     Imaging:  XR left wrist:  Intra-articular distal radius fracture with significant shortening, 10° inclination, and neutral tilt     Assessment/Plan:  44 y.o. right-hand dominant male  who fell off his truck [employer] on 07/19/2025 sustaining a left distal radius fracture.  Plan for ORIF left distal radius tomorrow with Dr. Mera  Pain meds provided today, he will not get painless tomorrow  Nonweightbearing left upper extremity, splint were placed  Follow up for surgery tomorrow

## 2025-07-29 NOTE — OP NOTE
Operative Note     Date of Service: 07/29/2025     Pre-Operative Diagnosis: left distal radius fracture    Post-Operative Diagnosis: Same     Procedure(s):   1. ORIF left distal radius, intraarticular 3 pieces    Anesthesia: General     Surgeon: Srinivasan Mera MD, was present and scrubbed for the key portions of the procedure.     Assistant(s):   Immanuel Theodore MD    Indications   44 y.o. right-hand dominant male  who fell off his truck tailgate on 07/19/2025 sustaining a left distal radius fracture.  There was no blood or open wounds at the time of injury.  He has no numbness in his hand.  He was seen in the emergency room and placed in a splint. He had significant shortening, 10 degrees inclination and neutral tilt. A decision was made to proceed with ORIF distal radius. The patient was checked again in the Holding Room. The risks, benefits, complications, treatment options, and expected outcomes were reviewed again with the patient. The patient has elected to proceed with ORIF left distal radius. The risks and potential complications include but are not limited to infection, nerve injury, vascular injury, persistent pain, potential skin necrosis, deep vein thrombosis, possible pulmonary embolus, complications of the anesthetics and failure of the implants with potential need for future surgery to remove or revise. The patient concurred with the proposed plan, giving informed consent. The site of surgery was identified by the patient and properly noted/marked by me.     Implant:   1. Synthes Volt distal radius plate (standard 2.4/2.7mm)   2. 2.4mm locking, 2.7mm cortical screws    Procedure Details:   The patient was taken to the Operating Room . A Time-Out was held and the patient, location and procedure of ORIF left distal radius were verified and agreed upon by all members of the operating room staff. Prophylacic antibiotics were given.The patient was given anesthesia.   Following the  successful induction of anesthesia the patient was placed supine. The left upper extremity was prepped and draped in normal sterile manner. The limb was exsanguinated and a tourniquet inflated to 250mmhg for a total of 69 minutes. An FCR approach was utilized. An incision was made, extending down to the FCR tendon. The superficial sheath was incised and FCR taken ulnarly. The deep sheath was incised. The FPL tendon was visualized and mobilized ulnarly. Pronator quadratus was released off its radial and distal attachments. The fracture was visualized. There was a large volar shear fracture. There was an intraarticular split as well. This added up to 3 pieces that we would fix to the shaft. Callous and interposed pronator quadratus was removed from the fracture. The volar pieces were reduced anatomically. Dorsally there was significant comminution. The fracture was reduced to neutral tilt and held in place with a radial styloid pin. A plate was placed and fixed using the oblong hole. K-wires were used to visualize the most distal extent of our screws. A kickstand technique was used to help restore volar tilt. The plate was fixed distally using a cortical screw. The rest of the distal screw holes were filled unicortically using a locking tower. The proximal screw was then tightened, completing our kickstand. Three more shaft screws were then placed. The wrist was visualized under AP, DRUJ, lateral, 23 degree radially deviated view, and skyline. All screws were appropriately placed. The wound was copiously irrigated.   3-0 vicryl was used to close the subcutaneous layer and 3-0 moncoryl was used to close subcuticularly. Dermabond and steristrips were placed.  A volar slab splint was applied.  Instrument, sponge, and needle counts were correct prior to wound closure and at the conclusion of the case.   The patient was awoken from anesthesia, tolerated the procedure well and taken to recovery in stable condition.    Findings: 3 piece intraarticular distal radius fracture   Estimated blood loss: 5cc   Drains: none   Total IV fluids: Per anesthesia records   Specimens: none   Complications: None, pt tolerated the procedure well   Disposition: Awakened from anesthesia, and taken to the recovery room in a stable condition, having suffered no apparent untoward event   Condition: Stable   Post-Operative Management   NWB at least 6 weeks  Transition to velcro wrist splint at 2 weeks    Discharge Medications:   Pain medication     Immanuel Theodore MD  [facility] Orthopaedic Surgery

## 2025-07-29 NOTE — DISCHARGE SUMMARY
[facility]  Discharge Note  Short Stay    Procedure(s) (LRB):  ORIF, FRACTURE, RADIUS, DISTAL (Left)      OUTCOME: Patient tolerated treatment/procedure well without complication and is now ready for discharge.    DISPOSITION: Home or Self Care    FINAL DIAGNOSIS:  <principal problem not specified>    FOLLOWUP: In clinic    DISCHARGE INSTRUCTIONS:  No discharge procedures on file.     TIME SPENT ON DISCHARGE: 5 minutes

## 2025-07-29 NOTE — PROGRESS NOTES
Faculty Attestation: Taiwo Hyman  was seen at [facility] in the Orthopaedic Clinic. Patient seen and evaluated at the time of the visit. History of Present Illness, Physical Exam, and Assessment and Plan reviewed. Treatment plan is reasonable and appropriate. Compliance with treatment recommendations is important. No procedure was performed.     Aramis Mera MD  Orthopaedic Surgery

## 2025-07-29 NOTE — DISCHARGE INSTRUCTIONS
Ortho    · Keep follow up appointment on _8/13/2025@ 10:50 AM__ at [facility].    `  Resume home medications unless otherwise instructed by your doctor.     · Take pain medications as prescribed.    ` See included nerve block handout -Keep arm in sling until you regain full function of your shoulder and elbow    · Keep arm elevated on pillow, higher than the level of your heart,  to decrease pain and swelling.    · No driving or consuming alcohol ( NO CERVEZA !) for the next 24 hours or while taking narcotic pain medicine.    · May apply ice pack to surgical area for 20 minutes at a time 6-8 times per day.    · Dressing: Leave clean and dry until follow up appointment.    · NO LIFTING OR PULLING WITH AFFECTED HAND until cleared by MD. You may wiggle fingers.    · NAUSEA: You can eat and drink whatever you like as tolerated. You may experience post anesthesia nausea. Apply cold cloths to your face and neck. Smell of rubbing alcohol on a cotton ball can help, peppermint and emigdio smells and foods can as well. Call your doctor for a prescription for nausea medication. If you have any uncontrolled vomiting that is not resolving within a few hours, report to your emergency room. Resume all medications tomorrow.    ` BLEEDING: Can apply pressure for 5 - 10 min with a clean towel. If bleeding continues after applying pressure go to ER and call the doctor.    · Notify MD of any moderate to severe pain unrelieved by pain medicine or for any signs of infection:I Including fever above 100.4, excessive redness or swelling, yellow/green foul- smelling drainage, nausea or vomiting. Call clinic at: [phone]. After business hours, if your concern is an emergency, call 911 or report to your nearest emergency room.    · Thanks for choosing [facility]! Have a smooth recovery!______________________________________________________________________________________________                                                                                                                 · Presente tereza abelardo de seguimiento el _13/8/2025_ a las __10:50 AM__ en la [facility]. ` Reanude los medicamentos en casa, a menos que benitez médico indique lo contrario. · [place] los medicamentos para el dolor según lo recetado. ` Consulte el folleto incluido sobre el bloque nervioso - Mantenga el brazo en cabestrillo hasta que recupere la función completa de benitez hombro y codo. · Mantenga el brazo elevado sobre tereza almohada, más alto que el nivel de benitez corazón, para disminuir el dolor y la hinchazón. · No conduzca ni consuma alcohol (¡NO CERVEZA!) gary las próximas 24 horas o mientras esté tomando medicamentos narcóticos para el dolor. · Puede aplicar tereza bolsa de hielo en el área quirúrgica gary 20 minutos a la vez, de 6 a 8 veces al día. · Vendaje: Déjelo limpio y seco hasta la abelardo de seguimiento. · NO LEVANTE NI ESTIRE CON LA MANO AFECTADA hasta que un médico lo autorice. Puede  los dedos.                      · NAUSEA: Puedes comer y beber lo que desees según lo tolerado. Es posible que experimentes náuseas post-anestesia. Aplica compresas frías en tu carlito y alyssa. Oler alcohol isopropílico en un algodón puede ayudar, así minda los olores y alimentos de menta y jengibre. Llama a tu médico para obtener tereza receta para medicamentos contra las náuseas. Si tienes vómitos incontrolados que no se resuelven en unas pocas horas, dirígete a la seven de emergencias. Reanuda todos los medicamentos mañana. ` [place]: Puedes aplicar presión gary 5 a 10 minutos con tereza toalla limpia. Si el sangrado continúa después de aplicar presión, ve a la seven de emergencias y llama al médico. · Notifica al médico sobre cualquier dolor moderado a severo que no se alivie con medicamentos para el dolor o cualquier signo de infección: incluyendo fiebre superior a 100.4, enrojecimiento o hinchazón excesiva, drenaje de olor desagradable amarillo/romel,  náuseas o vómitos. Llama a la clínica al: [phone]. Después del horario laboral, si tu preocupación es tereza emergencia, llama al 911 o dirígete a la seven de emergencias más cercana. · ¡[place] por elegir [facility]!

## 2025-07-29 NOTE — ANESTHESIA PROCEDURE NOTES
Peripheral Block    Patient location during procedure: pre-op    Reason for block: primary anesthetic    Diagnosis: Lt ORIF distal radius   Start time: 7/29/2025 8:45 AM  Timeout: 7/29/2025 8:45 AM   End time: 7/29/2025 8:50 AM    Staffing  Authorizing Provider: Marjan Sanchez MD  Performing Provider: Marjan Sanchez MD    Staffing  Performed by: Marjan Sanchez MD  Authorized by: Marjan Sanchez MD    Preanesthetic Checklist  Completed: patient identified, IV checked, site marked, risks and benefits discussed, surgical consent, monitors and equipment checked, pre-op evaluation and timeout performed  Peripheral Block  Patient position: sitting  Prep: ChloraPrep  Patient monitoring: heart rate, cardiac monitor, continuous pulse ox, continuous capnometry and frequent blood pressure checks  Block type: infraclavicular  Laterality: left  Injection technique: single shot  Needle  Needle type: Stimuplex   Needle gauge: 21 G  Needle length: 4 in  Needle localization: ultrasound guidance and anatomical landmarks   -ultrasound image captured on disc.  Assessment  Injection assessment: negative aspiration and negative parasthesia  Paresthesia pain: none  Heart rate change: no  Slow fractionated injection: yes  Pain Tolerance: comfortable throughout block and no complaints  Medications:    Medications: ropivacaine (NAROPIN) injection 0.5% - Perineural   30 mL - 7/29/2025 8:46:00 AM    Additional Notes  VSS.  [facility] RN monitoring vitals throughout procedure.  Patient tolerated procedure well.

## 2025-07-29 NOTE — TRANSFER OF CARE
Anesthesia Transfer of Care Note    Patient: Taiwo Hyman    Procedure(s) Performed: Procedure(s) (LRB):  ORIF, FRACTURE, RADIUS, DISTAL (Left)    Patient location: OPS    Anesthesia Type: MAC    Transport from OR: Transported from OR on room air with adequate spontaneous ventilation    Post pain: adequate analgesia    Post assessment: no apparent anesthetic complications    Post vital signs: stable    Level of consciousness: awake    Nausea/Vomiting: no nausea/vomiting    Complications: none    Transfer of care protocol was followed      Last vitals: Visit Vitals  BP (!) 152/89   Pulse 63   Temp 36.4 °C (97.6 °F) (Oral)   Resp 16   Wt 105.6 kg (232 lb 12.8 oz)   SpO2 (!) 93%   BMI 36.46 kg/m²

## 2025-07-30 VITALS
RESPIRATION RATE: 20 BRPM | TEMPERATURE: 97 F | WEIGHT: 232.81 LBS | HEART RATE: 54 BPM | DIASTOLIC BLOOD PRESSURE: 96 MMHG | BODY MASS INDEX: 36.46 KG/M2 | OXYGEN SATURATION: 97 % | SYSTOLIC BLOOD PRESSURE: 154 MMHG

## 2025-07-30 DIAGNOSIS — S52.572A OTHER CLOSED INTRA-ARTICULAR FRACTURE OF DISTAL END OF LEFT RADIUS, INITIAL ENCOUNTER: Primary | ICD-10-CM

## 2025-07-30 RX ORDER — OXYCODONE HYDROCHLORIDE 5 MG/1
5 TABLET ORAL EVERY 6 HOURS PRN
Qty: 28 TABLET | Refills: 0 | Status: SHIPPED | OUTPATIENT
Start: 2025-08-01

## 2025-07-30 NOTE — OR NURSING
"The following information is sent by secure chat to Dr Immanuel Theodore (ortho resident) and Kaity AGUILERA, ortho nurse) I spoke with Mr Aguilar's significant other today. She reports that they have not been able to get his pain under control, she stages "he's been crying with pain" Reviewed medications/scheduling with her and she verifies giving as ordered. Please contact them @ [phone]. Thank you   "

## 2025-08-13 ENCOUNTER — HOSPITAL ENCOUNTER (OUTPATIENT)
Dept: RADIOLOGY | Facility: HOSPITAL | Age: 45
Discharge: HOME | End: 2025-08-13
Attending: SPECIALIST

## 2025-08-13 ENCOUNTER — OFFICE VISIT (OUTPATIENT)
Dept: ORTHOPEDICS | Facility: CLINIC | Age: 45
End: 2025-08-13

## 2025-08-13 VITALS
WEIGHT: 234 LBS | TEMPERATURE: 99 F | HEIGHT: 67 IN | OXYGEN SATURATION: 99 % | DIASTOLIC BLOOD PRESSURE: 86 MMHG | SYSTOLIC BLOOD PRESSURE: 144 MMHG | BODY MASS INDEX: 36.73 KG/M2

## 2025-08-13 DIAGNOSIS — S52.615A CLOSED NONDISPLACED FRACTURE OF STYLOID PROCESS OF LEFT ULNA, INITIAL ENCOUNTER: ICD-10-CM

## 2025-08-13 DIAGNOSIS — S52.572A OTHER CLOSED INTRA-ARTICULAR FRACTURE OF DISTAL END OF LEFT RADIUS, INITIAL ENCOUNTER: ICD-10-CM

## 2025-08-13 DIAGNOSIS — M25.532 LEFT WRIST PAIN: Primary | ICD-10-CM

## 2025-08-13 DIAGNOSIS — M25.532 LEFT WRIST PAIN: ICD-10-CM

## 2025-08-13 PROCEDURE — 99024 POSTOP FOLLOW-UP VISIT: CPT | Mod: ,,, | Performed by: SPECIALIST

## 2025-08-13 PROCEDURE — 99213 OFFICE O/P EST LOW 20 MIN: CPT | Mod: PBBFAC,25

## 2025-08-13 PROCEDURE — 73110 X-RAY EXAM OF WRIST: CPT | Mod: TC,LT

## 2025-08-14 ENCOUNTER — TELEPHONE (OUTPATIENT)
Dept: ORTHOPEDICS | Facility: CLINIC | Age: 45
End: 2025-08-14

## 2025-08-14 DIAGNOSIS — S52.572A OTHER CLOSED INTRA-ARTICULAR FRACTURE OF DISTAL END OF LEFT RADIUS, INITIAL ENCOUNTER: Primary | ICD-10-CM

## 2025-08-14 RX ORDER — OXYCODONE HYDROCHLORIDE 5 MG/1
5 TABLET ORAL EVERY 6 HOURS PRN
Qty: 28 TABLET | Refills: 0 | Status: SHIPPED | OUTPATIENT
Start: 2025-08-14

## 2025-08-26 ENCOUNTER — TELEPHONE (OUTPATIENT)
Dept: ORTHOPEDICS | Facility: CLINIC | Age: 45
End: 2025-08-26

## 2025-08-27 RX ORDER — GABAPENTIN 300 MG/1
300 CAPSULE ORAL 3 TIMES DAILY
Qty: 90 CAPSULE | Refills: 0 | Status: SHIPPED | OUTPATIENT
Start: 2025-08-27 | End: 2026-08-27

## (undated) DEVICE — PENCIL ELECSURG ROCKER 15FT

## (undated) DEVICE — Device

## (undated) DEVICE — BLADE SURG STAINLESS STEEL #15

## (undated) DEVICE — GLOVE SENSICARE PI GRN 7.5

## (undated) DEVICE — ADHESIVE DERMABOND ADVANCED

## (undated) DEVICE — CUFF ATS 2 PORT SNGL BLDR 18IN

## (undated) DEVICE — DRAPE HAND STERILE

## (undated) DEVICE — GLOVE SENSICARE NEOPRENE 6.5

## (undated) DEVICE — GLOVE SIGNATURE MICRO LTX 7

## (undated) DEVICE — K-WIRE TRCR PT1.6MM DIA 150MM
Type: IMPLANTABLE DEVICE | Site: ARM | Status: NON-FUNCTIONAL
Removed: 2025-07-29

## (undated) DEVICE — GAUZE XEROFORM NONADH 5X9IN

## (undated) DEVICE — BANDAGE ESMARK ELASTIC ST 4X9

## (undated) DEVICE — SOL NACL IRR 1000ML BTL

## (undated) DEVICE — SPONGE GAUZE 16PLY 4X4

## (undated) DEVICE — APPLICATOR CHLORAPREP ORN 26ML

## (undated) DEVICE — GLOVE PROTEXIS LTX MICRO 8

## (undated) DEVICE — COVER C-ARM STRAP BAND 44X80IN

## (undated) DEVICE — STOCKINETTE IMPERVIOUS LARGE

## (undated) DEVICE — NDL ECLIPSE SAF REG 25GX1.5IN

## (undated) DEVICE — GLOVE PROTEXIS BLUE LATEX 8

## (undated) DEVICE — BIT DRILL 2.0MM D 65MM/50MM.

## (undated) DEVICE — TOURNIQUET SB QC DP 18X4IN

## (undated) DEVICE — K-WIRE TRCR PT1.25MM D 150MM L
Type: IMPLANTABLE DEVICE | Site: ARM | Status: NON-FUNCTIONAL
Removed: 2025-07-29

## (undated) DEVICE — SUT VICRYL 3-0 27 SH

## (undated) DEVICE — GLOVE SIGNATURE MICRO LTX 8

## (undated) DEVICE — GOWN POLY REINF X-LONG 2XL

## (undated) DEVICE — PAD CAST SOF-ROL RAYON 4INX4YD

## (undated) DEVICE — DRESSING N ADH OIL EMUL 3X8 3S

## (undated) DEVICE — SUT VICRYL PLUS 3-0 FS1 27

## (undated) DEVICE — ELECTRODE PATIENT RETURN DISP

## (undated) DEVICE — BUCKET PLASTER DISPOSABLE

## (undated) DEVICE — GLOVE SIGNATURE MICRO LTX 6.5

## (undated) DEVICE — GLOVE SIGNATURE MICRO LTX 7.5

## (undated) DEVICE — DRAPE INCISE IOBAN 2 13X13IN

## (undated) DEVICE — KIT SURGICAL TURNOVER

## (undated) DEVICE — SUT ETHILON 4-0 PS4 18 BLK

## (undated) DEVICE — GLOVE SENSICARE PI GRN 8

## (undated) DEVICE — DRAPE STERI U-SHAPED 47X51IN

## (undated) DEVICE — PAD ABDOMINAL STERILE 8X10IN

## (undated) DEVICE — PADDING WYTEX UNDRCST 4INX4YD

## (undated) DEVICE — CLOSURE SKIN STERI STRIP 1/2X4

## (undated) DEVICE — CORD CAUTERY BIPOLAR STERILE